# Patient Record
Sex: MALE | Race: WHITE | NOT HISPANIC OR LATINO | Employment: FULL TIME | URBAN - METROPOLITAN AREA
[De-identification: names, ages, dates, MRNs, and addresses within clinical notes are randomized per-mention and may not be internally consistent; named-entity substitution may affect disease eponyms.]

---

## 2017-02-14 ENCOUNTER — AMBULATORY - HEALTHEAST (OUTPATIENT)
Dept: FAMILY MEDICINE | Facility: CLINIC | Age: 42
End: 2017-02-14

## 2017-03-01 ENCOUNTER — RECORDS - HEALTHEAST (OUTPATIENT)
Dept: GENERAL RADIOLOGY | Facility: CLINIC | Age: 42
End: 2017-03-01

## 2017-03-01 ENCOUNTER — OFFICE VISIT - HEALTHEAST (OUTPATIENT)
Dept: FAMILY MEDICINE | Facility: CLINIC | Age: 42
End: 2017-03-01

## 2017-03-01 DIAGNOSIS — M25.551 HIP PAIN, RIGHT: ICD-10-CM

## 2017-03-01 DIAGNOSIS — L72.3 SEBACEOUS CYST: ICD-10-CM

## 2017-03-01 DIAGNOSIS — M25.551 PAIN IN RIGHT HIP: ICD-10-CM

## 2017-03-01 DIAGNOSIS — J40 BRONCHITIS: ICD-10-CM

## 2017-03-01 DIAGNOSIS — M54.2 CERVICALGIA: ICD-10-CM

## 2017-03-01 ASSESSMENT — MIFFLIN-ST. JEOR: SCORE: 1913.9

## 2017-03-06 ENCOUNTER — COMMUNICATION - HEALTHEAST (OUTPATIENT)
Dept: FAMILY MEDICINE | Facility: CLINIC | Age: 42
End: 2017-03-06

## 2017-03-06 DIAGNOSIS — M54.2 CERVICALGIA: ICD-10-CM

## 2017-03-06 DIAGNOSIS — M25.551 PAIN OF RIGHT HIP JOINT: ICD-10-CM

## 2017-03-29 ENCOUNTER — HOSPITAL ENCOUNTER (OUTPATIENT)
Dept: MRI IMAGING | Facility: CLINIC | Age: 42
Discharge: HOME | End: 2017-03-29
Attending: FAMILY MEDICINE

## 2017-03-29 DIAGNOSIS — M25.551 PAIN OF RIGHT HIP JOINT: ICD-10-CM

## 2017-03-31 ENCOUNTER — RECORDS - HEALTHEAST (OUTPATIENT)
Dept: ADMINISTRATIVE | Facility: OTHER | Age: 42
End: 2017-03-31

## 2017-04-03 ENCOUNTER — COMMUNICATION - HEALTHEAST (OUTPATIENT)
Dept: FAMILY MEDICINE | Facility: CLINIC | Age: 42
End: 2017-04-03

## 2017-04-04 ENCOUNTER — AMBULATORY - HEALTHEAST (OUTPATIENT)
Dept: FAMILY MEDICINE | Facility: CLINIC | Age: 42
End: 2017-04-04

## 2017-04-04 DIAGNOSIS — S73.199A ACETABULAR LABRUM TEAR: ICD-10-CM

## 2017-04-06 ENCOUNTER — RECORDS - HEALTHEAST (OUTPATIENT)
Dept: ADMINISTRATIVE | Facility: OTHER | Age: 42
End: 2017-04-06

## 2017-04-11 ENCOUNTER — COMMUNICATION - HEALTHEAST (OUTPATIENT)
Dept: FAMILY MEDICINE | Facility: CLINIC | Age: 42
End: 2017-04-11

## 2017-04-13 ENCOUNTER — AMBULATORY - HEALTHEAST (OUTPATIENT)
Dept: FAMILY MEDICINE | Facility: CLINIC | Age: 42
End: 2017-04-13

## 2017-04-13 ENCOUNTER — OFFICE VISIT - HEALTHEAST (OUTPATIENT)
Dept: FAMILY MEDICINE | Facility: CLINIC | Age: 42
End: 2017-04-13

## 2017-04-13 DIAGNOSIS — Z01.818 PREOP EXAMINATION: ICD-10-CM

## 2017-04-13 DIAGNOSIS — M24.159 LABRAL TEAR OF HIP, DEGENERATIVE: ICD-10-CM

## 2017-04-13 ASSESSMENT — MIFFLIN-ST. JEOR: SCORE: 1905.97

## 2017-04-21 ENCOUNTER — RECORDS - HEALTHEAST (OUTPATIENT)
Dept: ADMINISTRATIVE | Facility: OTHER | Age: 42
End: 2017-04-21

## 2017-05-09 ENCOUNTER — RECORDS - HEALTHEAST (OUTPATIENT)
Dept: ADMINISTRATIVE | Facility: OTHER | Age: 42
End: 2017-05-09

## 2017-05-18 ENCOUNTER — COMMUNICATION - HEALTHEAST (OUTPATIENT)
Dept: FAMILY MEDICINE | Facility: CLINIC | Age: 42
End: 2017-05-18

## 2017-05-26 ENCOUNTER — RECORDS - HEALTHEAST (OUTPATIENT)
Dept: ADMINISTRATIVE | Facility: OTHER | Age: 42
End: 2017-05-26

## 2017-06-06 ENCOUNTER — RECORDS - HEALTHEAST (OUTPATIENT)
Dept: ADMINISTRATIVE | Facility: OTHER | Age: 42
End: 2017-06-06

## 2017-07-18 ENCOUNTER — RECORDS - HEALTHEAST (OUTPATIENT)
Dept: ADMINISTRATIVE | Facility: OTHER | Age: 42
End: 2017-07-18

## 2017-07-20 ENCOUNTER — RECORDS - HEALTHEAST (OUTPATIENT)
Dept: ADMINISTRATIVE | Facility: OTHER | Age: 42
End: 2017-07-20

## 2017-07-26 ENCOUNTER — OFFICE VISIT - HEALTHEAST (OUTPATIENT)
Dept: FAMILY MEDICINE | Facility: CLINIC | Age: 42
End: 2017-07-26

## 2017-07-26 DIAGNOSIS — M25.552 LEFT HIP PAIN: ICD-10-CM

## 2017-07-26 DIAGNOSIS — M24.159 LABRAL TEAR OF HIP, DEGENERATIVE: ICD-10-CM

## 2017-07-26 DIAGNOSIS — K51.90 ULCERATIVE COLITIS WITHOUT COMPLICATIONS, UNSPECIFIED LOCATION (H): ICD-10-CM

## 2017-07-26 DIAGNOSIS — Z30.09 ENCOUNTER FOR VASECTOMY COUNSELING: ICD-10-CM

## 2017-08-19 ENCOUNTER — COMMUNICATION - HEALTHEAST (OUTPATIENT)
Dept: SCHEDULING | Facility: CLINIC | Age: 42
End: 2017-08-19

## 2017-08-19 ENCOUNTER — COMMUNICATION - HEALTHEAST (OUTPATIENT)
Dept: FAMILY MEDICINE | Facility: CLINIC | Age: 42
End: 2017-08-19

## 2017-08-19 DIAGNOSIS — M24.159 LABRAL TEAR OF HIP, DEGENERATIVE: ICD-10-CM

## 2017-08-22 ENCOUNTER — COMMUNICATION - HEALTHEAST (OUTPATIENT)
Dept: SCHEDULING | Facility: CLINIC | Age: 42
End: 2017-08-22

## 2017-08-22 DIAGNOSIS — M24.159 LABRAL TEAR OF HIP, DEGENERATIVE: ICD-10-CM

## 2017-08-28 ENCOUNTER — RECORDS - HEALTHEAST (OUTPATIENT)
Dept: ADMINISTRATIVE | Facility: OTHER | Age: 42
End: 2017-08-28

## 2017-09-08 ENCOUNTER — AMBULATORY - HEALTHEAST (OUTPATIENT)
Dept: FAMILY MEDICINE | Facility: CLINIC | Age: 42
End: 2017-09-08

## 2017-09-08 DIAGNOSIS — Z30.2 ENCOUNTER FOR VASECTOMY: ICD-10-CM

## 2017-09-12 ENCOUNTER — RECORDS - HEALTHEAST (OUTPATIENT)
Dept: ADMINISTRATIVE | Facility: OTHER | Age: 42
End: 2017-09-12

## 2017-09-13 LAB
LAB AP CHARGES (HE HISTORICAL CONVERSION): NORMAL
PATH REPORT.COMMENTS IMP SPEC: NORMAL
PATH REPORT.FINAL DX SPEC: NORMAL
PATH REPORT.GROSS SPEC: NORMAL
PATH REPORT.MICROSCOPIC SPEC OTHER STN: NORMAL
PATH REPORT.RELEVANT HX SPEC: NORMAL
RESULT FLAG (HE HISTORICAL CONVERSION): NORMAL

## 2017-10-10 ENCOUNTER — RECORDS - HEALTHEAST (OUTPATIENT)
Dept: ADMINISTRATIVE | Facility: OTHER | Age: 42
End: 2017-10-10

## 2017-10-12 ENCOUNTER — OFFICE VISIT - HEALTHEAST (OUTPATIENT)
Dept: FAMILY MEDICINE | Facility: CLINIC | Age: 42
End: 2017-10-12

## 2017-10-12 ENCOUNTER — AMBULATORY - HEALTHEAST (OUTPATIENT)
Dept: FAMILY MEDICINE | Facility: CLINIC | Age: 42
End: 2017-10-12

## 2017-10-12 DIAGNOSIS — J20.9 ACUTE WHEEZY BRONCHITIS: ICD-10-CM

## 2017-10-12 DIAGNOSIS — Z91.09 ENVIRONMENTAL ALLERGIES: ICD-10-CM

## 2017-10-12 DIAGNOSIS — K51.90 ULCERATIVE COLITIS WITHOUT COMPLICATIONS, UNSPECIFIED LOCATION (H): ICD-10-CM

## 2017-11-20 ENCOUNTER — RECORDS - HEALTHEAST (OUTPATIENT)
Dept: ADMINISTRATIVE | Facility: OTHER | Age: 42
End: 2017-11-20

## 2017-11-21 ENCOUNTER — RECORDS - HEALTHEAST (OUTPATIENT)
Dept: ADMINISTRATIVE | Facility: OTHER | Age: 42
End: 2017-11-21

## 2018-01-17 ENCOUNTER — RECORDS - HEALTHEAST (OUTPATIENT)
Dept: ADMINISTRATIVE | Facility: OTHER | Age: 43
End: 2018-01-17

## 2018-03-08 ENCOUNTER — OFFICE VISIT - HEALTHEAST (OUTPATIENT)
Dept: FAMILY MEDICINE | Facility: CLINIC | Age: 43
End: 2018-03-08

## 2018-03-08 ENCOUNTER — RECORDS - HEALTHEAST (OUTPATIENT)
Dept: GENERAL RADIOLOGY | Facility: CLINIC | Age: 43
End: 2018-03-08

## 2018-03-08 DIAGNOSIS — J18.9 CAP (COMMUNITY ACQUIRED PNEUMONIA): ICD-10-CM

## 2018-03-08 DIAGNOSIS — J18.9 PNEUMONIA, UNSPECIFIED ORGANISM: ICD-10-CM

## 2018-03-08 DIAGNOSIS — K51.90 ULCERATIVE COLITIS WITHOUT COMPLICATIONS, UNSPECIFIED LOCATION (H): ICD-10-CM

## 2018-03-08 DIAGNOSIS — Z98.52 HISTORY OF VASECTOMY: ICD-10-CM

## 2018-03-08 DIAGNOSIS — J20.9 ACUTE WHEEZY BRONCHITIS: ICD-10-CM

## 2018-03-08 LAB
ERYTHROCYTE [DISTWIDTH] IN BLOOD BY AUTOMATED COUNT: 12.3 % (ref 11–14.5)
HCT VFR BLD AUTO: 46.1 % (ref 40–54)
HGB BLD-MCNC: 15.8 G/DL (ref 14–18)
MCH RBC QN AUTO: 29.7 PG (ref 27–34)
MCHC RBC AUTO-ENTMCNC: 34.2 G/DL (ref 32–36)
MCV RBC AUTO: 87 FL (ref 80–100)
PLATELET # BLD AUTO: 167 THOU/UL (ref 140–440)
PMV BLD AUTO: 7.8 FL (ref 7–10)
RBC # BLD AUTO: 5.31 MILL/UL (ref 4.4–6.2)
WBC: 9.1 THOU/UL (ref 4–11)

## 2018-03-14 ENCOUNTER — COMMUNICATION - HEALTHEAST (OUTPATIENT)
Dept: FAMILY MEDICINE | Facility: CLINIC | Age: 43
End: 2018-03-14

## 2018-03-16 ENCOUNTER — RECORDS - HEALTHEAST (OUTPATIENT)
Dept: ADMINISTRATIVE | Facility: OTHER | Age: 43
End: 2018-03-16

## 2018-03-18 ENCOUNTER — COMMUNICATION - HEALTHEAST (OUTPATIENT)
Dept: FAMILY MEDICINE | Facility: CLINIC | Age: 43
End: 2018-03-18

## 2018-03-18 DIAGNOSIS — J20.9 ACUTE WHEEZY BRONCHITIS: ICD-10-CM

## 2018-03-21 ENCOUNTER — COMMUNICATION - HEALTHEAST (OUTPATIENT)
Dept: FAMILY MEDICINE | Facility: CLINIC | Age: 43
End: 2018-03-21

## 2018-03-21 DIAGNOSIS — J20.9 ACUTE WHEEZY BRONCHITIS: ICD-10-CM

## 2018-05-11 ENCOUNTER — RECORDS - HEALTHEAST (OUTPATIENT)
Dept: ADMINISTRATIVE | Facility: OTHER | Age: 43
End: 2018-05-11

## 2018-05-17 ENCOUNTER — RECORDS - HEALTHEAST (OUTPATIENT)
Dept: ADMINISTRATIVE | Facility: OTHER | Age: 43
End: 2018-05-17

## 2018-07-25 ENCOUNTER — RECORDS - HEALTHEAST (OUTPATIENT)
Dept: ADMINISTRATIVE | Facility: OTHER | Age: 43
End: 2018-07-25

## 2018-10-03 ENCOUNTER — COMMUNICATION - HEALTHEAST (OUTPATIENT)
Dept: SCHEDULING | Facility: CLINIC | Age: 43
End: 2018-10-03

## 2018-10-04 ENCOUNTER — OFFICE VISIT - HEALTHEAST (OUTPATIENT)
Dept: FAMILY MEDICINE | Facility: CLINIC | Age: 43
End: 2018-10-04

## 2018-10-04 ENCOUNTER — RECORDS - HEALTHEAST (OUTPATIENT)
Dept: ADMINISTRATIVE | Facility: OTHER | Age: 43
End: 2018-10-04

## 2018-10-04 DIAGNOSIS — M25.473 ANKLE SWELLING: ICD-10-CM

## 2018-10-04 DIAGNOSIS — Z00.00 HEALTHCARE MAINTENANCE: ICD-10-CM

## 2018-10-04 LAB
C REACTIVE PROTEIN LHE: 0.7 MG/DL (ref 0–0.8)
ERYTHROCYTE [SEDIMENTATION RATE] IN BLOOD BY WESTERGREN METHOD: 11 MM/HR (ref 0–15)
RHEUMATOID FACT SERPL-ACNC: <15 IU/ML (ref 0–30)

## 2018-10-04 ASSESSMENT — MIFFLIN-ST. JEOR: SCORE: 1857.65

## 2018-10-05 ENCOUNTER — COMMUNICATION - HEALTHEAST (OUTPATIENT)
Dept: FAMILY MEDICINE | Facility: CLINIC | Age: 43
End: 2018-10-05

## 2018-10-05 LAB — B BURGDOR IGG+IGM SER QL: <0.01 INDEX VALUE

## 2018-10-08 LAB — ANA SER QL: 0.2 U

## 2018-12-13 ENCOUNTER — RECORDS - HEALTHEAST (OUTPATIENT)
Dept: ADMINISTRATIVE | Facility: OTHER | Age: 43
End: 2018-12-13

## 2019-01-14 ENCOUNTER — RECORDS - HEALTHEAST (OUTPATIENT)
Dept: ADMINISTRATIVE | Facility: OTHER | Age: 44
End: 2019-01-14

## 2019-01-29 ENCOUNTER — RECORDS - HEALTHEAST (OUTPATIENT)
Dept: ADMINISTRATIVE | Facility: OTHER | Age: 44
End: 2019-01-29

## 2019-02-20 ENCOUNTER — RECORDS - HEALTHEAST (OUTPATIENT)
Dept: ADMINISTRATIVE | Facility: OTHER | Age: 44
End: 2019-02-20

## 2019-04-15 ENCOUNTER — OFFICE VISIT - HEALTHEAST (OUTPATIENT)
Dept: FAMILY MEDICINE | Facility: CLINIC | Age: 44
End: 2019-04-15

## 2019-04-15 DIAGNOSIS — S80.212A ABRASION OF LEFT KNEE, INITIAL ENCOUNTER: ICD-10-CM

## 2019-04-17 ENCOUNTER — RECORDS - HEALTHEAST (OUTPATIENT)
Dept: ADMINISTRATIVE | Facility: OTHER | Age: 44
End: 2019-04-17

## 2019-05-04 ENCOUNTER — RECORDS - HEALTHEAST (OUTPATIENT)
Dept: ADMINISTRATIVE | Facility: OTHER | Age: 44
End: 2019-05-04

## 2019-05-17 ENCOUNTER — RECORDS - HEALTHEAST (OUTPATIENT)
Dept: LAB | Facility: CLINIC | Age: 44
End: 2019-05-17

## 2019-05-20 LAB — BACTERIA SPEC CULT: NORMAL

## 2019-05-22 LAB
BACTERIA SPEC CULT: ABNORMAL
GRAM STAIN RESULT: ABNORMAL
GRAM STAIN RESULT: ABNORMAL

## 2019-05-26 ENCOUNTER — RECORDS - HEALTHEAST (OUTPATIENT)
Dept: ADMINISTRATIVE | Facility: OTHER | Age: 44
End: 2019-05-26

## 2019-05-29 ENCOUNTER — ANESTHESIA - HEALTHEAST (OUTPATIENT)
Dept: SURGERY | Facility: CLINIC | Age: 44
End: 2019-05-29

## 2019-05-29 ENCOUNTER — SURGERY - HEALTHEAST (OUTPATIENT)
Dept: SURGERY | Facility: CLINIC | Age: 44
End: 2019-05-29

## 2019-05-29 ASSESSMENT — MIFFLIN-ST. JEOR: SCORE: 1859.75

## 2019-05-30 ENCOUNTER — HOME CARE/HOSPICE - HEALTHEAST (OUTPATIENT)
Dept: HOME HEALTH SERVICES | Facility: HOME HEALTH | Age: 44
End: 2019-05-30

## 2019-06-01 ASSESSMENT — MIFFLIN-ST. JEOR: SCORE: 1900.29

## 2019-06-03 ENCOUNTER — COMMUNICATION - HEALTHEAST (OUTPATIENT)
Dept: ADMINISTRATIVE | Facility: CLINIC | Age: 44
End: 2019-06-03

## 2019-06-04 ENCOUNTER — COMMUNICATION - HEALTHEAST (OUTPATIENT)
Dept: CARE COORDINATION | Facility: CLINIC | Age: 44
End: 2019-06-04

## 2019-06-10 ENCOUNTER — OFFICE VISIT - HEALTHEAST (OUTPATIENT)
Dept: FAMILY MEDICINE | Facility: CLINIC | Age: 44
End: 2019-06-10

## 2019-06-10 DIAGNOSIS — K51.90 ULCERATIVE COLITIS WITHOUT COMPLICATIONS, UNSPECIFIED LOCATION (H): ICD-10-CM

## 2019-06-10 DIAGNOSIS — M70.42 PREPATELLAR BURSITIS OF LEFT KNEE: ICD-10-CM

## 2019-06-10 DIAGNOSIS — D64.9 ANEMIA, UNSPECIFIED TYPE: ICD-10-CM

## 2019-06-12 ENCOUNTER — AMBULATORY - HEALTHEAST (OUTPATIENT)
Dept: LAB | Facility: CLINIC | Age: 44
End: 2019-06-12

## 2019-06-12 ENCOUNTER — OFFICE VISIT - HEALTHEAST (OUTPATIENT)
Dept: INFECTIOUS DISEASES | Facility: CLINIC | Age: 44
End: 2019-06-12

## 2019-06-12 DIAGNOSIS — M70.42 PREPATELLAR BURSITIS, LEFT KNEE: ICD-10-CM

## 2019-06-12 DIAGNOSIS — M70.42 PREPATELLAR BURSITIS OF LEFT KNEE: ICD-10-CM

## 2019-06-12 LAB
ANION GAP SERPL CALCULATED.3IONS-SCNC: 6 MMOL/L (ref 5–18)
BASOPHILS # BLD AUTO: 0 THOU/UL (ref 0–0.2)
BASOPHILS NFR BLD AUTO: 1 % (ref 0–2)
BUN SERPL-MCNC: 14 MG/DL (ref 8–22)
C REACTIVE PROTEIN LHE: 0.2 MG/DL (ref 0–0.8)
CALCIUM SERPL-MCNC: 10.4 MG/DL (ref 8.5–10.5)
CHLORIDE BLD-SCNC: 102 MMOL/L (ref 98–107)
CO2 SERPL-SCNC: 30 MMOL/L (ref 22–31)
CREAT SERPL-MCNC: 0.96 MG/DL (ref 0.7–1.3)
EOSINOPHIL # BLD AUTO: 0.4 THOU/UL (ref 0–0.4)
EOSINOPHIL NFR BLD AUTO: 6 % (ref 0–6)
ERYTHROCYTE [DISTWIDTH] IN BLOOD BY AUTOMATED COUNT: 11.6 % (ref 11–14.5)
GFR SERPL CREATININE-BSD FRML MDRD: >60 ML/MIN/1.73M2
GLUCOSE BLD-MCNC: 90 MG/DL (ref 70–125)
HCT VFR BLD AUTO: 44.8 % (ref 40–54)
HGB BLD-MCNC: 15.2 G/DL (ref 14–18)
LYMPHOCYTES # BLD AUTO: 1.9 THOU/UL (ref 0.8–4.4)
LYMPHOCYTES NFR BLD AUTO: 30 % (ref 20–40)
MCH RBC QN AUTO: 29.4 PG (ref 27–34)
MCHC RBC AUTO-ENTMCNC: 33.9 G/DL (ref 32–36)
MCV RBC AUTO: 87 FL (ref 80–100)
MONOCYTES # BLD AUTO: 0.4 THOU/UL (ref 0–0.9)
MONOCYTES NFR BLD AUTO: 6 % (ref 2–10)
NEUTROPHILS # BLD AUTO: 3.6 THOU/UL (ref 2–7.7)
NEUTROPHILS NFR BLD AUTO: 57 % (ref 50–70)
PLATELET # BLD AUTO: 298 THOU/UL (ref 140–440)
PMV BLD AUTO: 7.2 FL (ref 7–10)
POTASSIUM BLD-SCNC: 5 MMOL/L (ref 3.5–5)
RBC # BLD AUTO: 5.17 MILL/UL (ref 4.4–6.2)
SODIUM SERPL-SCNC: 138 MMOL/L (ref 136–145)
WBC: 6.4 THOU/UL (ref 4–11)

## 2019-06-12 ASSESSMENT — MIFFLIN-ST. JEOR: SCORE: 1878.75

## 2019-06-25 ENCOUNTER — COMMUNICATION - HEALTHEAST (OUTPATIENT)
Dept: FAMILY MEDICINE | Facility: CLINIC | Age: 44
End: 2019-06-25

## 2019-06-25 ENCOUNTER — RECORDS - HEALTHEAST (OUTPATIENT)
Dept: ADMINISTRATIVE | Facility: OTHER | Age: 44
End: 2019-06-25

## 2019-06-25 DIAGNOSIS — M70.42 PREPATELLAR BURSITIS OF LEFT KNEE: ICD-10-CM

## 2019-06-26 ENCOUNTER — OFFICE VISIT - HEALTHEAST (OUTPATIENT)
Dept: INFECTIOUS DISEASES | Facility: CLINIC | Age: 44
End: 2019-06-26

## 2019-06-26 ENCOUNTER — AMBULATORY - HEALTHEAST (OUTPATIENT)
Dept: LAB | Facility: CLINIC | Age: 44
End: 2019-06-26

## 2019-06-26 ENCOUNTER — COMMUNICATION - HEALTHEAST (OUTPATIENT)
Dept: INFECTIOUS DISEASES | Facility: CLINIC | Age: 44
End: 2019-06-26

## 2019-06-26 DIAGNOSIS — M70.42 PREPATELLAR BURSITIS OF LEFT KNEE: ICD-10-CM

## 2019-06-26 LAB
BASOPHILS # BLD AUTO: 0 THOU/UL (ref 0–0.2)
BASOPHILS NFR BLD AUTO: 0 % (ref 0–2)
C REACTIVE PROTEIN LHE: <0.1 MG/DL (ref 0–0.8)
CREAT SERPL-MCNC: 0.86 MG/DL (ref 0.7–1.3)
EOSINOPHIL # BLD AUTO: 0.2 THOU/UL (ref 0–0.4)
EOSINOPHIL NFR BLD AUTO: 4 % (ref 0–6)
ERYTHROCYTE [DISTWIDTH] IN BLOOD BY AUTOMATED COUNT: 12.1 % (ref 11–14.5)
GFR SERPL CREATININE-BSD FRML MDRD: >60 ML/MIN/1.73M2
HCT VFR BLD AUTO: 46.5 % (ref 40–54)
HGB BLD-MCNC: 15.6 G/DL (ref 14–18)
LYMPHOCYTES # BLD AUTO: 1.9 THOU/UL (ref 0.8–4.4)
LYMPHOCYTES NFR BLD AUTO: 35 % (ref 20–40)
MCH RBC QN AUTO: 29.2 PG (ref 27–34)
MCHC RBC AUTO-ENTMCNC: 33.6 G/DL (ref 32–36)
MCV RBC AUTO: 87 FL (ref 80–100)
MONOCYTES # BLD AUTO: 0.3 THOU/UL (ref 0–0.9)
MONOCYTES NFR BLD AUTO: 6 % (ref 2–10)
NEUTROPHILS # BLD AUTO: 3 THOU/UL (ref 2–7.7)
NEUTROPHILS NFR BLD AUTO: 55 % (ref 50–70)
PLATELET # BLD AUTO: 195 THOU/UL (ref 140–440)
PMV BLD AUTO: 8.1 FL (ref 7–10)
RBC # BLD AUTO: 5.34 MILL/UL (ref 4.4–6.2)
WBC: 5.4 THOU/UL (ref 4–11)

## 2019-06-26 ASSESSMENT — MIFFLIN-ST. JEOR: SCORE: 1850.63

## 2019-07-19 ENCOUNTER — RECORDS - HEALTHEAST (OUTPATIENT)
Dept: ADMINISTRATIVE | Facility: OTHER | Age: 44
End: 2019-07-19

## 2019-07-31 ENCOUNTER — COMMUNICATION - HEALTHEAST (OUTPATIENT)
Dept: INFECTIOUS DISEASES | Facility: CLINIC | Age: 44
End: 2019-07-31

## 2019-08-02 ENCOUNTER — RECORDS - HEALTHEAST (OUTPATIENT)
Dept: ADMINISTRATIVE | Facility: OTHER | Age: 44
End: 2019-08-02

## 2019-08-09 ENCOUNTER — OFFICE VISIT - HEALTHEAST (OUTPATIENT)
Dept: FAMILY MEDICINE | Facility: CLINIC | Age: 44
End: 2019-08-09

## 2019-08-09 DIAGNOSIS — H69.91 DYSFUNCTION OF RIGHT EUSTACHIAN TUBE: ICD-10-CM

## 2019-08-09 DIAGNOSIS — K51.90 ULCERATIVE COLITIS WITHOUT COMPLICATIONS, UNSPECIFIED LOCATION (H): ICD-10-CM

## 2019-08-09 DIAGNOSIS — M70.42 PREPATELLAR BURSITIS OF LEFT KNEE: ICD-10-CM

## 2019-11-05 ENCOUNTER — OFFICE VISIT - HEALTHEAST (OUTPATIENT)
Dept: FAMILY MEDICINE | Facility: CLINIC | Age: 44
End: 2019-11-05

## 2019-11-05 DIAGNOSIS — M25.552 PAIN OF BOTH HIP JOINTS: ICD-10-CM

## 2019-11-05 DIAGNOSIS — R53.83 FATIGUE, UNSPECIFIED TYPE: ICD-10-CM

## 2019-11-05 DIAGNOSIS — K51.90 ULCERATIVE COLITIS WITHOUT COMPLICATIONS, UNSPECIFIED LOCATION (H): ICD-10-CM

## 2019-11-05 DIAGNOSIS — M25.511 PAIN IN JOINT OF RIGHT SHOULDER: ICD-10-CM

## 2019-11-05 DIAGNOSIS — M25.562 ARTHRALGIA OF BOTH KNEES: ICD-10-CM

## 2019-11-05 DIAGNOSIS — M25.551 PAIN OF BOTH HIP JOINTS: ICD-10-CM

## 2019-11-05 DIAGNOSIS — R53.81 MALAISE: ICD-10-CM

## 2019-11-05 DIAGNOSIS — M25.561 ARTHRALGIA OF BOTH KNEES: ICD-10-CM

## 2019-11-05 LAB
25(OH)D3 SERPL-MCNC: 24.3 NG/ML (ref 30–80)
25(OH)D3 SERPL-MCNC: 24.3 NG/ML (ref 30–80)
ALBUMIN SERPL-MCNC: 4.5 G/DL (ref 3.5–5)
ALP SERPL-CCNC: 96 U/L (ref 45–120)
ALT SERPL W P-5'-P-CCNC: 21 U/L (ref 0–45)
ANION GAP SERPL CALCULATED.3IONS-SCNC: 11 MMOL/L (ref 5–18)
AST SERPL W P-5'-P-CCNC: 22 U/L (ref 0–40)
BASOPHILS # BLD AUTO: 0 THOU/UL (ref 0–0.2)
BASOPHILS NFR BLD AUTO: 1 % (ref 0–2)
BILIRUB SERPL-MCNC: 1.1 MG/DL (ref 0–1)
BUN SERPL-MCNC: 14 MG/DL (ref 8–22)
C REACTIVE PROTEIN LHE: 0.2 MG/DL (ref 0–0.8)
CALCIUM SERPL-MCNC: 9.5 MG/DL (ref 8.5–10.5)
CHLORIDE BLD-SCNC: 105 MMOL/L (ref 98–107)
CK SERPL-CCNC: 91 U/L (ref 30–190)
CO2 SERPL-SCNC: 25 MMOL/L (ref 22–31)
CREAT SERPL-MCNC: 1.01 MG/DL (ref 0.7–1.3)
EOSINOPHIL # BLD AUTO: 0.3 THOU/UL (ref 0–0.4)
EOSINOPHIL NFR BLD AUTO: 4 % (ref 0–6)
ERYTHROCYTE [DISTWIDTH] IN BLOOD BY AUTOMATED COUNT: 12.4 % (ref 11–14.5)
GFR SERPL CREATININE-BSD FRML MDRD: >60 ML/MIN/1.73M2
GLUCOSE BLD-MCNC: 72 MG/DL (ref 70–125)
HCT VFR BLD AUTO: 47.4 % (ref 40–54)
HGB BLD-MCNC: 16.5 G/DL (ref 14–18)
LYMPHOCYTES # BLD AUTO: 2.5 THOU/UL (ref 0.8–4.4)
LYMPHOCYTES NFR BLD AUTO: 34 % (ref 20–40)
MCH RBC QN AUTO: 30.3 PG (ref 27–34)
MCHC RBC AUTO-ENTMCNC: 34.7 G/DL (ref 32–36)
MCV RBC AUTO: 87 FL (ref 80–100)
MONOCYTES # BLD AUTO: 0.5 THOU/UL (ref 0–0.9)
MONOCYTES NFR BLD AUTO: 7 % (ref 2–10)
NEUTROPHILS # BLD AUTO: 4 THOU/UL (ref 2–7.7)
NEUTROPHILS NFR BLD AUTO: 54 % (ref 50–70)
PLATELET # BLD AUTO: 213 THOU/UL (ref 140–440)
PMV BLD AUTO: 7.9 FL (ref 7–10)
POTASSIUM BLD-SCNC: 4 MMOL/L (ref 3.5–5)
PROT SERPL-MCNC: 7.6 G/DL (ref 6–8)
RBC # BLD AUTO: 5.43 MILL/UL (ref 4.4–6.2)
SODIUM SERPL-SCNC: 141 MMOL/L (ref 136–145)
TSH SERPL DL<=0.005 MIU/L-ACNC: 1.95 UIU/ML (ref 0.3–5)
VIT B12 SERPL-MCNC: 684 PG/ML (ref 213–816)
WBC: 7.4 THOU/UL (ref 4–11)

## 2019-11-07 LAB
GLIADIN IGA SER-ACNC: 1.5 U/ML
GLIADIN IGG SER-ACNC: <0.4 U/ML
IGA SERPL-MCNC: 397 MG/DL (ref 65–400)
TTG IGA SER-ACNC: 0.5 U/ML
TTG IGG SER-ACNC: <0.6 U/ML

## 2019-11-19 ENCOUNTER — COMMUNICATION - HEALTHEAST (OUTPATIENT)
Dept: FAMILY MEDICINE | Facility: CLINIC | Age: 44
End: 2019-11-19

## 2019-11-20 ENCOUNTER — COMMUNICATION - HEALTHEAST (OUTPATIENT)
Dept: FAMILY MEDICINE | Facility: CLINIC | Age: 44
End: 2019-11-20

## 2019-11-22 ENCOUNTER — RECORDS - HEALTHEAST (OUTPATIENT)
Dept: ADMINISTRATIVE | Facility: OTHER | Age: 44
End: 2019-11-22

## 2019-11-25 ENCOUNTER — RECORDS - HEALTHEAST (OUTPATIENT)
Dept: ADMINISTRATIVE | Facility: OTHER | Age: 44
End: 2019-11-25

## 2019-12-03 ENCOUNTER — RECORDS - HEALTHEAST (OUTPATIENT)
Dept: ADMINISTRATIVE | Facility: OTHER | Age: 44
End: 2019-12-03

## 2019-12-05 ENCOUNTER — RECORDS - HEALTHEAST (OUTPATIENT)
Dept: ADMINISTRATIVE | Facility: OTHER | Age: 44
End: 2019-12-05

## 2020-01-14 ENCOUNTER — RECORDS - HEALTHEAST (OUTPATIENT)
Dept: ADMINISTRATIVE | Facility: OTHER | Age: 45
End: 2020-01-14

## 2020-03-02 ENCOUNTER — RECORDS - HEALTHEAST (OUTPATIENT)
Dept: ADMINISTRATIVE | Facility: OTHER | Age: 45
End: 2020-03-02

## 2020-03-03 ENCOUNTER — RECORDS - HEALTHEAST (OUTPATIENT)
Dept: ADMINISTRATIVE | Facility: OTHER | Age: 45
End: 2020-03-03

## 2020-03-04 ENCOUNTER — RECORDS - HEALTHEAST (OUTPATIENT)
Dept: ADMINISTRATIVE | Facility: OTHER | Age: 45
End: 2020-03-04

## 2020-03-09 ENCOUNTER — AMBULATORY - HEALTHEAST (OUTPATIENT)
Dept: FAMILY MEDICINE | Facility: CLINIC | Age: 45
End: 2020-03-09

## 2020-03-09 ENCOUNTER — OFFICE VISIT - HEALTHEAST (OUTPATIENT)
Dept: FAMILY MEDICINE | Facility: CLINIC | Age: 45
End: 2020-03-09

## 2020-03-09 DIAGNOSIS — M25.521 CHRONIC ELBOW PAIN, RIGHT: ICD-10-CM

## 2020-03-09 DIAGNOSIS — G89.29 CHRONIC ELBOW PAIN, RIGHT: ICD-10-CM

## 2020-03-09 DIAGNOSIS — M25.551 HIP PAIN, RIGHT: ICD-10-CM

## 2020-03-12 ENCOUNTER — COMMUNICATION - HEALTHEAST (OUTPATIENT)
Dept: FAMILY MEDICINE | Facility: CLINIC | Age: 45
End: 2020-03-12

## 2020-03-12 DIAGNOSIS — M70.42 PREPATELLAR BURSITIS OF LEFT KNEE: ICD-10-CM

## 2020-04-13 ENCOUNTER — COMMUNICATION - HEALTHEAST (OUTPATIENT)
Dept: FAMILY MEDICINE | Facility: CLINIC | Age: 45
End: 2020-04-13

## 2020-04-14 ENCOUNTER — OFFICE VISIT - HEALTHEAST (OUTPATIENT)
Dept: FAMILY MEDICINE | Facility: CLINIC | Age: 45
End: 2020-04-14

## 2020-04-14 DIAGNOSIS — M70.42 PREPATELLAR BURSITIS OF LEFT KNEE: ICD-10-CM

## 2020-04-24 ENCOUNTER — RECORDS - HEALTHEAST (OUTPATIENT)
Dept: ADMINISTRATIVE | Facility: OTHER | Age: 45
End: 2020-04-24

## 2020-06-11 ENCOUNTER — RECORDS - HEALTHEAST (OUTPATIENT)
Dept: ADMINISTRATIVE | Facility: OTHER | Age: 45
End: 2020-06-11

## 2020-06-11 LAB
ALT SERPL W/O P-5'-P-CCNC: 29 U/L (ref 0–78)
AST SERPL-CCNC: 18 U/L (ref 0–37)

## 2020-06-18 ENCOUNTER — RECORDS - HEALTHEAST (OUTPATIENT)
Dept: HEALTH INFORMATION MANAGEMENT | Facility: CLINIC | Age: 45
End: 2020-06-18

## 2020-07-31 ENCOUNTER — RECORDS - HEALTHEAST (OUTPATIENT)
Dept: ADMINISTRATIVE | Facility: OTHER | Age: 45
End: 2020-07-31

## 2020-08-04 ENCOUNTER — VIRTUAL VISIT (OUTPATIENT)
Dept: FAMILY MEDICINE | Facility: OTHER | Age: 45
End: 2020-08-04
Payer: COMMERCIAL

## 2020-08-04 ENCOUNTER — AMBULATORY - HEALTHEAST (OUTPATIENT)
Dept: FAMILY MEDICINE | Facility: CLINIC | Age: 45
End: 2020-08-04

## 2020-08-04 DIAGNOSIS — Z20.822 SUSPECTED COVID-19 VIRUS INFECTION: ICD-10-CM

## 2020-08-04 PROCEDURE — 99421 OL DIG E/M SVC 5-10 MIN: CPT | Performed by: PHYSICIAN ASSISTANT

## 2020-08-04 NOTE — PROGRESS NOTES
"Date: 2020 10:16:58  Clinician: Tyson Melendez  Clinician NPI: [id number]  Patient: Edwin Becker  Patient : 1975  Patient Address: [address] [place] [address]  Patient Phone: [phone]  Visit Protocol: URI  Patient Summary:  Edwin is a 45 year old ( : 1975 ) male who initiated a Visit for COVID-19 (Coronavirus) evaluation and screening. When asked the question \"Please sign me up to receive news, health information and promotions from [employer].\", Edwin responded \"No\".    Edwin states his symptoms started 1-2 days ago.   His symptoms consist of myalgia, a sore throat, a cough, nasal congestion, rhinitis, and malaise.   Symptom details     Nasal secretions: The color of his mucus is yellow.    Cough: Edwin coughs a few times an hour and his cough is not more bothersome at night. Phlegm does not come into his throat when he coughs. He does not believe his cough is caused by post-nasal drip.     Sore throat: Edwin reports having mild throat pain (1-3 on a 10 point pain scale), does not have exudate on his tonsils, and can swallow liquids. He is not sure if the lymph nodes in his neck are enlarged. A rash has not appeared on the skin since the sore throat started.      Edwin denies having wheezing, nausea, teeth pain, ageusia, diarrhea, anosmia, facial pain or pressure, fever, vomiting, ear pain, headache, and chills. He also denies having recent facial or sinus surgery in the past 60 days and taking antibiotic medication in the past month. He is not experiencing dyspnea.   Precipitating events  Within the past week, Edwin has not been exposed to someone with strep throat. He has not recently been exposed to someone with influenza. Edwin has not been in close contact with any high risk individuals.   Pertinent COVID-19 (Coronavirus) information  In the past 14 days, Edwin has not worked in a congregate living setting.   He does not work or volunteer as healthcare worker or a first " responder and does not work or volunteer in a healthcare facility.   Edwin also has not lived in a congregate living setting in the past 14 days. He does not live with a healthcare worker.   Edwin has not had a close contact with a laboratory-confirmed COVID-19 patient within 14 days of symptom onset.   Pertinent medical history  Edwin does not need a return to work/school note.   Weight: 215 lbs   Edwin does not smoke or use smokeless tobacco.   Weight: 215 lbs    MEDICATIONS: Remicade intravenous, ALLERGIES: NKDA  Clinician Response:  Dear Edwin,   Your symptoms show that you may have coronavirus (COVID-19). This illness can cause fever, cough and trouble breathing. Many people get a mild case and get better on their own. Some people can get very sick.  What should I do?  We would like to test you for this virus.   1. Please call [phone] to schedule your visit. Explain that you were referred by [facility] to have a COVID-19 test. Be ready to share your [facility] visit ID number.  The following will serve as your written order for this COVID Test, ordered by me, for the indication of suspected COVID [Z20.828]: The test will be ordered in [facility], our electronic health record, after you are scheduled. It will show as ordered and authorized by Ismael Toure MD.  Order: COVID-19 (Coronavirus) PCR for SYMPTOMATIC testing from [facility].      2. When it's time for your COVID test:  Stay at least 6 feet away from others. (If someone will drive you to your test, stay in the backseat, as far away from the  as you can.)   Cover your mouth and nose with a mask, tissue or washcloth.  Go straight to the testing site. Don't make any stops on the way there or back.      3.Starting now: Stay home and away from others (self-isolate) until:   You've had no fever---and no medicine that reduces fever---for 3 full days (72 hours). And...   Your other symptoms have gotten better. For example, your cough or breathing has improved.  "And...   At least 10 days have passed since your symptoms started.       During this time, don't leave the house except for testing or medical care.   Stay in your own room, even for meals. Use your own bathroom if you can.   Stay away from others in your home. No hugging, kissing or shaking hands. No visitors.  Don't go to work, school or anywhere else.    Clean \"high touch\" surfaces often (doorknobs, counters, handles, etc.). Use a household cleaning spray or wipes. You'll find a full list of  on the EPA website: www.epa.gov/pesticide-registration/list-n-disinfectants-use-against-sars-cov-2.   Cover your mouth and nose with a mask, tissue or washcloth to avoid spreading germs.  Wash your hands and face often. Use soap and water.  Caregivers in these groups are at risk for severe illness due to COVID-19:  o People 65 years and older  o People who live in a nursing home or long-term care facility  o People with chronic disease (lung, heart, cancer, diabetes, kidney, liver, immunologic)  o People who have a weakened immune system, including those who:   Are in cancer treatment  Take medicine that weakens the immune system, such as corticosteroids  Had a bone marrow or organ transplant  Have an immune deficiency  Have poorly controlled HIV or AIDS  Are obese (body mass index of 40 or higher)  Smoke regularly   o Caregivers should wear gloves while washing dishes, handling laundry and cleaning bedrooms and bathrooms.  o Use caution when washing and drying laundry: Don't shake dirty laundry, and use the warmest water setting that you can.  o For more tips, go to www.cdc.gov/coronavirus/2019-ncov/downloads/10Things.pdf.    4.Sign up for [employer]. We know it's scary to hear that you might have COVID-19. We want to track your symptoms to make sure you're okay over the next 2 weeks. Please look for an email from [employer]---this is a free, online program that we'll use to keep in touch. To sign up, follow the " link in the email. Learn more at [email or web address]  How can I take care of myself?   Get lots of rest. Drink extra fluids (unless a doctor has told you not to).   Take Tylenol (acetaminophen) for fever or pain. If you have liver or kidney problems, ask your family doctor if it's okay to take Tylenol.   Adults can take either:    650 mg (two 325 mg pills) every 4 to 6 hours, or...   1,000 mg (two 500 mg pills) every 8 hours as needed.    Note: Don't take more than 3,000 mg in one day. Acetaminophen is found in many medicines (both prescribed and over-the-counter medicines). Read all labels to be sure you don't take too much.   For children, check the Tylenol bottle for the right dose. The dose is based on the child's age or weight.    If you have other health problems (like cancer, heart failure, an organ transplant or severe kidney disease): Call your specialty clinic if you don't feel better in the next 2 days.       Know when to call 911. Emergency warning signs include:    Trouble breathing or shortness of breath Pain or pressure in the chest that doesn't go away Feeling confused like you haven't felt before, or not being able to wake up Bluish-colored lips or face.  Where can I get more information?   [facility] -- About COVID-19: [email or web address]   CDC -- What to Do If You're Sick: www.cdc.gov/coronavirus/2019-ncov/about/steps-when-sick.html   CDC -- Ending Home Isolation: www.cdc.gov/coronavirus/2019-ncov/hcp/disposition-in-home-patients.html   CDC -- Caring for Someone: www.cdc.gov/coronavirus/2019-ncov/if-you-are-sick/care-for-someone.html   [facility] -- Interim Guidance for Hospital Discharge to Home: www.health.[facility].[place]/diseases/coronavirus/hcp/hospdischarge.pdf   [facility] clinical trials (COVID-19 research studies): clinicalaffairs.[facility]/umn-clinical-trials    Below are the COVID-19 hotlines at the [place] Department of Health ([facility]). Interpreters are  available.    For health questions: Call 273-998-9148 or 1-434.511.3599 (7 a.m. to 7 p.m.) For questions about schools and childcare: Call 517-849-6292 or 1-784.206.2822 (7 a.m. to 7 p.m.)    Diagnosis: Pain in throat  Diagnosis ICD: R07.0

## 2020-08-05 ENCOUNTER — AMBULATORY - HEALTHEAST (OUTPATIENT)
Dept: FAMILY MEDICINE | Facility: CLINIC | Age: 45
End: 2020-08-05

## 2020-08-05 DIAGNOSIS — Z20.822 SUSPECTED COVID-19 VIRUS INFECTION: ICD-10-CM

## 2020-08-07 ENCOUNTER — COMMUNICATION - HEALTHEAST (OUTPATIENT)
Dept: SCHEDULING | Facility: CLINIC | Age: 45
End: 2020-08-07

## 2020-09-18 ENCOUNTER — RECORDS - HEALTHEAST (OUTPATIENT)
Dept: ADMINISTRATIVE | Facility: OTHER | Age: 45
End: 2020-09-18

## 2020-11-03 ENCOUNTER — COMMUNICATION - HEALTHEAST (OUTPATIENT)
Dept: FAMILY MEDICINE | Facility: CLINIC | Age: 45
End: 2020-11-03

## 2020-11-19 ENCOUNTER — RECORDS - HEALTHEAST (OUTPATIENT)
Dept: ADMINISTRATIVE | Facility: OTHER | Age: 45
End: 2020-11-19

## 2020-11-19 LAB
ALT SERPL W/O P-5'-P-CCNC: 34 U/L (ref 0–78)
AST SERPL-CCNC: 22 U/L (ref 0–37)

## 2020-11-25 ENCOUNTER — RECORDS - HEALTHEAST (OUTPATIENT)
Dept: HEALTH INFORMATION MANAGEMENT | Facility: CLINIC | Age: 45
End: 2020-11-25

## 2021-02-15 ENCOUNTER — RECORDS - HEALTHEAST (OUTPATIENT)
Dept: ADMINISTRATIVE | Facility: OTHER | Age: 46
End: 2021-02-15

## 2021-03-27 ENCOUNTER — AMBULATORY - HEALTHEAST (OUTPATIENT)
Dept: NURSING | Facility: CLINIC | Age: 46
End: 2021-03-27

## 2021-04-17 ENCOUNTER — AMBULATORY - HEALTHEAST (OUTPATIENT)
Dept: NURSING | Facility: CLINIC | Age: 46
End: 2021-04-17

## 2021-05-04 ENCOUNTER — RECORDS - HEALTHEAST (OUTPATIENT)
Dept: ADMINISTRATIVE | Facility: OTHER | Age: 46
End: 2021-05-04

## 2021-05-04 ENCOUNTER — OFFICE VISIT - HEALTHEAST (OUTPATIENT)
Dept: FAMILY MEDICINE | Facility: CLINIC | Age: 46
End: 2021-05-04

## 2021-05-04 DIAGNOSIS — R05.9 COUGH: ICD-10-CM

## 2021-05-04 DIAGNOSIS — K21.9 GASTROESOPHAGEAL REFLUX DISEASE, UNSPECIFIED WHETHER ESOPHAGITIS PRESENT: ICD-10-CM

## 2021-05-04 DIAGNOSIS — R13.10 DYSPHAGIA, UNSPECIFIED TYPE: ICD-10-CM

## 2021-05-27 ENCOUNTER — RECORDS - HEALTHEAST (OUTPATIENT)
Dept: ADMINISTRATIVE | Facility: OTHER | Age: 46
End: 2021-05-27

## 2021-05-27 VITALS
TEMPERATURE: 98.7 F | BODY MASS INDEX: 29.03 KG/M2 | OXYGEN SATURATION: 98 % | HEART RATE: 85 BPM | DIASTOLIC BLOOD PRESSURE: 78 MMHG | WEIGHT: 220 LBS | SYSTOLIC BLOOD PRESSURE: 128 MMHG

## 2021-05-27 NOTE — PATIENT INSTRUCTIONS - HE
1.  Recommend cleansing the wound with gentle soap and water.  Pat dry gently and cover with bandage.  2.  Keep dressing on until healing is more complete, expected 1 week.  Change dressing every 12 hours.  3.  Ice the knee on and off for 20 minutes at a time up to 3 times a day.  4.  Take Tylenol or ibuprofen as needed for pain control.  5.  Follow-up if you are developing any signs of infection or worsening symptoms including increased pain, swelling, redness, or fever.    
Rash

## 2021-05-27 NOTE — PROGRESS NOTES
Chief Complaint   Patient presents with     Leg Injury     states hit in Lt leg with a coil spring at work, states came out of tool he was working with. has an abraison on lateral LT leg       HPI:  Edwin Becker is a 43 y.o. male who presents today complaining of left leg pain after a coil spring at work flew out of the tool he was working on and hit him in the leg. He has an abrasion on the lateral aspect of his left leg. He was wearing jeans when the injury occurred. He is able to bear weight and does not report severe pain. He is up to date on his Tetanus vaccine status. Last Tdap was 9/27/2012.     History obtained from the patient.    Problem List:  2017-04: Labral tear of hip, degenerative  2014-12: Cough  Dyslipidemia  Anemia  Ulcerative Colitis  Cervicalgia  Rotator Cuff Tendonitis  Trapezoid Muscle Strain  Pneumonia      No past medical history on file.    Social History     Tobacco Use     Smoking status: Former Smoker     Last attempt to quit: 7/27/2003     Years since quitting: 15.7     Smokeless tobacco: Never Used   Substance Use Topics     Alcohol use: No       Review of Systems   Musculoskeletal: Negative for gait problem.        (+) left knee pain   Skin: Positive for wound.   All other systems reviewed and are negative.      Vitals:    04/15/19 1517   BP: 116/80   Patient Site: Right Arm   Patient Position: Sitting   Cuff Size: Adult Regular   Pulse: 89   Temp: 98.4  F (36.9  C)   TempSrc: Oral   SpO2: 98%   Weight: 210 lb (95.3 kg)       Physical Exam   Constitutional: He appears well-developed and well-nourished. No distress.   HENT:   Head: Normocephalic and atraumatic.   Right Ear: External ear normal.   Left Ear: External ear normal.   Eyes: Conjunctivae are normal. Right eye exhibits no discharge. Left eye exhibits no discharge.   Pulmonary/Chest: Effort normal. No respiratory distress.   Musculoskeletal:        Left knee: He exhibits normal range of motion, no swelling, no effusion, no  deformity, no laceration and no erythema. Tenderness found. Lateral joint line tenderness noted.   Neurological: Coordination and gait normal.   Skin: He is not diaphoretic.   Linear abrasion present on the lateral aspect of the left knee.  There is mild soft tissue swelling around the abrasion.  There is tenderness to palpation around the soft tissue swelling.   Psychiatric: He has a normal mood and affect. His behavior is normal. Judgment and thought content normal.       Clinical Decision Making:  No deformity, effusion, or significant knee swelling present.  Patient is able to bear weight and ambulates without difficulty.  I have a low suspicion for knee fracture.  I did offer to cleanse the abrasion, but patient said that he is capable of doing that at home.  He mainly came in due to protocol from his work.  He was reassured that that fracture is unlikely and x-ray is unnecessary.  At the end of the encounter, I discussed results, diagnosis, medications. Discussed red flags for immediate return to clinic/ER, as well as indications for follow up if no improvement. Patient understood and agreed to plan. Patient was stable for discharge.    1. Abrasion of left knee, initial encounter           Patient Instructions   1.  Recommend cleansing the wound with gentle soap and water.  Pat dry gently and cover with bandage.  2.  Keep dressing on until healing is more complete, expected 1 week.  Change dressing every 12 hours.  3.  Ice the knee on and off for 20 minutes at a time up to 3 times a day.  4.  Take Tylenol or ibuprofen as needed for pain control.  5.  Follow-up if you are developing any signs of infection or worsening symptoms including increased pain, swelling, redness, or fever.

## 2021-05-29 NOTE — PROGRESS NOTES
"TCM DISCHARGE FOLLOW UP CALL    Discharge Date:  6/1/2019  Reason for hospital stay (discharge diagnosis)::  Prepatellar bursitis, left knee  Are you feeling better, the same or worse since your discharge?:  Patient is feeling better (Still having some pain but incision & knee \"looks good.\"  States this is the 2nd surgery he's had on his knee in the last 2 weeks, and this time looks better than it did after the first surgery.  Afebrile)  Do you feel like you have a plan in the event of a health emergency?: Yes (ER)    As part of your discharge plan, were  home care services ordered for you?: No    Did you receive any new medications, or was there a change to your medications?: Yes    Are you taking those medications, or do you have any established regiment?:  RN reviewed discharge medications w/pt.  Pt states he is taking cephalexin 2 caps three times a day, 2 ES Tylenol three times a day, 600 mg of ibuprofen q 6 hours, 1 vistaril q 6 hours PRN, and 2 oxycodone when he wakes up, 1 in the afternoon ~ 3 pm, and 2 before bed.    Do you have any follow up visits scheduled with your PCP or Specialist?:  Yes, with PCP and Yes, with Specialist (Dr. Rodriguez 6/10/19)  (RN) Is PCP appt scheduled soon enough (within 14 days of discharge date)?: Yes    Who are you seeing and when is it scheduled?:  Ortho 6/11/19, ID 6/12/19      Radha Alexander RN Care Manager, Population Health    "

## 2021-05-29 NOTE — PROGRESS NOTES
Patient ID: Edwin Becker is a 43 y.o. male.  /76   Pulse 79   Temp 98.4  F (36.9  C)   SpO2 98%     Assessment/Plan:                Diagnoses and all orders for this visit:    Prepatellar bursitis of left knee  -     oxyCODONE (ROXICODONE) 5 MG immediate release tablet; Take 1 tablet (5 mg total) by mouth every 8 (eight) hours as needed.  Dispense: 15 tablet; Refill: 0    Anemia, unspecified type    Ulcerative colitis without complications, unspecified location (H)      DISCUSSION  He appears to be improving from his rather protracted course of prepatellar bursitis.  The wound shows no sign of any significant concern at this point and appears to be healing as intended.  He should follow-up with specialty providers as scheduled tomorrow and Wednesday.  Feel there is not a need at this point to recheck hemoglobin as this is likely secondary to the acute illness process.  In the future would consider rechecking.  He will defer questions regarding the dosing of Remicade to the infectious disease provider later this week.  Refill provided for oxycodone, recommend taking 1 tablet or even half tablet nightly for more severe pain.  Continue to use acetaminophen and ibuprofen for pain relief as the main medication treatment taper off of the oxycodone as soon as reasonably possible.  Subjective:     HPI    Edwin Becker is a 43 y.o. male is here today for follow-up of recent hospitalization for prepatellar bursitis requiring incision and drainage.    His medical history significant for ulcerative colitis for which she has been on Remicade.  He also has history of degenerative changes in the hip joints and had undergone surgery in the past couple of years.    His current trouble began in April 2019 when he sustained an injury to the left lateral portion of the knee while at work.  Patient states that a spring coil from a vehicle was in a compressor when it came out and struck him in the knee.  He was seen April  15, 2019 by Dara Toure.  Conservative therapy was recommended at that point in time.    May 4, 2019 he was seen in the emergency department and diagnosed with septic prepatellar bursitis.  He was given a dose of vancomycin in the emergency department and discharged on oral antibiotics.  He was subsequently seen by [facility].  He presented to the hospital emergency department again on May 9, 2019 at the advice of his orthopedic provider.  He underwent aspiration of the area, treatment with intravenous antibiotics and was discharged home on May 11, 2019 with oral Linezolid.  Staph aureus that grew on culture from May 17, 2019.  He was seen in follow-up again after this initial discharge by his orthopedic specialty providers and was recommended to return to the hospital on May 29, 2000 and because of failure to improve.  He underwent irrigation and debridement of the area.  He was seen in consultation with infectious disease.  He ultimately improved enough for discharge on June 1, 2019.  He is now back at home recovering.    He reports he is taking Tylenol and ibuprofen consistently for pain control.  Reports significantly increasing aching type pain that occurs late in the day into the evening which interferes with sleep.  He has run out of his oxycodone.  He feels pain control is not adequate.  Denies any signs or symptoms of infection such as fevers chills or night sweats.  He does feel tired and rundown.  Continues to take the cephalexin as prescribed.  Is elevating the leg when able.  Denies any significant concerns otherwise.    He has follow-up scheduled with orthopedic specialty provider tomorrow and with infectious disease on Wednesday.        Review of Systems  Complete review of systems is obtained.  Other than the specific considerations noted above complete review of systems is negative.          Objective:   Medications:  Current Outpatient Medications   Medication Sig     acetaminophen  (TYLENOL) 500 MG tablet Take 2 tablets (1,000 mg total) by mouth 3 (three) times a day.     cephalexin (KEFLEX) 500 MG capsule Take 2 capsules (1,000 mg total) by mouth 3 (three) times a day for 14 days.     cetirizine (ZYRTEC) 10 MG tablet Take 10 mg by mouth daily as needed for allergies.     ibuprofen (ADVIL) 200 MG tablet Take 3 tablets (600 mg total) by mouth every 6 (six) hours as needed for pain.     senna-docusate (PERICOLACE) 8.6-50 mg tablet Take 1 tablet by mouth 2 (two) times a day.     hydrOXYzine pamoate (VISTARIL) 25 MG capsule Take 1 capsule (25 mg total) by mouth every 6 (six) hours as needed for itching (pain).     oxyCODONE (ROXICODONE) 5 MG immediate release tablet Take 1 tablet (5 mg total) by mouth every 8 (eight) hours as needed.       Allergies:  No Known Allergies    Tobacco:   reports that he quit smoking about 15 years ago. He has never used smokeless tobacco.     Physical Exam          /76   Pulse 79   Temp 98.4  F (36.9  C)   SpO2 98%         General: Patient alert no signs of distress    Examination of his leg reveals that the surgical incision has some dried blood in the inferior portion of dissection is more open but patient states and it appears so that this was done this way initially and intentionally.  The sutures are in place.  There is minimal edema in the area there is no significant redness of the skin.  There is no edema in the lower portion of the leg below the knee joint.  No redness otherwise seen in the leg.  No other sores or lesions within the leg are noted.

## 2021-05-29 NOTE — PROGRESS NOTES
"[facility] INFECTIOUS DISEASE CLINIC FOLLOW UP NOTE    Date: 6/14/2019  Patient Name: Edwin Becker   YOB: 1975  MRN: [id number]      ASSESSMENT:  MSSA left prepatellar bursitis. S/p I+D 5/17. Returned with worsening swelling and had another I+D 5/29 -- operative findings with hematoma and necrotic tissue. Debridement was fairly extensive including to \"subtenons tissue, fascia and overlying quadriceps muscle and VMO.\" Culture negative at that time. Now on cephalexin 1000mg three times a day and there is no outward sign of infection. Labs look good. Infection may be gone. Wound not closed fully to allow drainage. Discussed with Dr. Rodriguez after visit and will plan another week of oral antibiotic due to his complicated course.         PLAN:  Labs today communicated to him after the visit.   Plan another week of cephalexin to be completed around 6/22/19.   Return in 2 weeks.     Richard Zaragoza MD  [facility]   Clinic phone: [phone]   Clinic fax: [phone]    ______________________________________________________________________    SUBJECTIVE / INTERVAL HISTORY: Edwin Becker returns for follow up of MSSA prepatellar bursitis left knee.     He had an superficial injury to the left distal lateral thigh from being hit by a coil spring about 1-2 weeks prior to noting swelling and pain anterior to the right knee. He had bursa aspirated 5/10, culture was negative. He was started on antibiotic, but due to poor response he underwent I+D of bursa with bursectomy on 5/17/19, culture grew MSSA. Recalls that he was on antibiotic for about a week after, probably linezolid from previous hospital stay. Noted increase in swelling, was seen again and admitted for another I+D 5/29. Operative findings showed hematoma, no pus, but there was necrotic tissue. Treated with IV cefazolin in the hospital, and discharged on cephalexin 1000mg three times a day.      Has UC and is treated with " Remicade q6 weeks, is due for next dose. UC is controlled on this. He feels joint pains coming on which is typical when this disease is flaring for him.     Knee pain but improved, looks much better. Tolerating cephalexin. Saw Dr. Nicholas yesterday. Feels that knee is looking better compared to last debridement at this time. ROM is still poor. Taking ibuprofen, tylenol during day, takes oxycodone at night to help sleep.     Past Medical History:   Diagnosis Date     Bursitis     left knee     Ulcerative colitis (H)     on remicade infusion every 6 nweeks      Past Surgical History:   Procedure Laterality Date     Bursectomy Left 05/17/2019    Knee     HIP SURGERY Bilateral     Labral Tear Repair     NE REPAIR UMBILICAL GHISLAINE,<4Y/O,REDUC      Description: Umbilical Hernia Repair;  Recorded: 11/05/2013;     SHOULDER SURGERY Right     RCR         ROS: All other systems negative except as listed above.      Current Outpatient Medications:      acetaminophen (TYLENOL) 500 MG tablet, Take 2 tablets (1,000 mg total) by mouth 3 (three) times a day., Disp: , Rfl: 0     [START ON 6/15/2019] cephalexin (KEFLEX) 500 MG capsule, Take 2 capsules (1,000 mg total) by mouth 3 (three) times a day for 7 days. Start once other prescription runs out, Disp: 42 capsule, Rfl: 0     cetirizine (ZYRTEC) 10 MG tablet, Take 10 mg by mouth daily as needed for allergies., Disp: , Rfl:      ibuprofen (ADVIL) 200 MG tablet, Take 3 tablets (600 mg total) by mouth every 6 (six) hours as needed for pain., Disp: , Rfl: 0     oxyCODONE (ROXICODONE) 5 MG immediate release tablet, Take 1 tablet (5 mg total) by mouth every 8 (eight) hours as needed., Disp: 15 tablet, Rfl: 0     hydrOXYzine pamoate (VISTARIL) 25 MG capsule, Take 1 capsule (25 mg total) by mouth every 6 (six) hours as needed for itching (pain)., Disp: 30 capsule, Rfl: 0     senna-docusate (PERICOLACE) 8.6-50 mg tablet, Take 1 tablet by mouth 2 (two) times a day., Disp: , Rfl:  0      OBJECTIVE:  Vitals:    06/12/19 1001   BP: 112/72   Temp: 97.9  F (36.6  C)         GEN: No acute distress.    RESPIRATORY:  Normal breathing pattern.   CARDIOVASCULAR:  Regular rate and rhythm. Normal S1 and S2.   ABDOMEN:  Soft, normal bowel sounds, non-tender  EXTREMITIES: L knee with incision remaining open as planned. There is what looks like clotted blood toward distal aspect. Mildly tender. No erythema. ROM is limited.   SKIN/HAIR/NAILS:  No rashes          Pertinent labs:    Results from last 7 days   Lab Units 06/12/19  1038   LN-WHITE BLOOD CELL COUNT thou/uL 6.4   LN-HEMOGLOBIN g/dL 15.2   LN-HEMATOCRIT % 44.8   LN-PLATELET COUNT thou/uL 298     Results from last 7 days   Lab Units 06/12/19  1038   LN-SODIUM mmol/L 138   LN-POTASSIUM mmol/L 5.0   LN-CHLORIDE mmol/L 102   LN-CO2 mmol/L 30   LN-BLOOD UREA NITROGEN mg/dL 14   LN-CREATININE mg/dL 0.96   LN-CALCIUM mg/dL 10.4     Lab Results   Component Value Date    CRP 0.2 06/12/2019         Lab Results   Component Value Date    ALT 22 05/10/2019    AST 17 05/10/2019    ALKPHOS 76 05/10/2019    BILITOT 0.3 05/10/2019         MICROBIOLOGY DATA:  MSSA

## 2021-05-29 NOTE — ANESTHESIA CARE TRANSFER NOTE
Last vitals:   Vitals:    05/29/19 1900   BP: 119/72   Pulse: 72   Resp: 12   Temp: 36.3  C (97.4  F)   SpO2: 98%     Patient's level of consciousness is drowsy  Spontaneous respirations: yes  Maintains airway independently: yes  Dentition unchanged: yes  Oropharynx: oral airway in place    QCDR Measures:  ASA# 20 - Surgical Safety Checklist: WHO surgical safety checklist completed prior to induction    PQRS# 430 - Adult PONV Prevention: 4558F - Pt received => 2 anti-emetic agents (different classes) preop & intraop  ASA# 8 - Peds PONV Prevention: NA - Not pediatric patient, not GA or 2 or more risk factors NOT present  PQRS# 424 - Carlyn-op Temp Management: 4559F - At least one body temp DOCUMENTED => 35.5C or 95.9F within required timeframe  PQRS# 426 - PACU Transfer Protocol: - Transfer of care checklist used  ASA# 14 - Acute Post-op Pain: ASA14B - Patient did NOT experience pain >= 7 out of 10

## 2021-05-29 NOTE — ANESTHESIA POSTPROCEDURE EVALUATION
Patient: Edwin Becker  IRRIGATION AND DEBRIDEMENT, POST OPERATIVE WOUND INFECTION  Anesthesia type: general    Patient location: PACU  Last vitals:   Vitals Value Taken Time   /72 5/29/2019  7:56 PM   Temp 36.7  C (98  F) 5/29/2019  7:56 PM   Pulse 73 5/29/2019  8:00 PM   Resp 18 5/29/2019  7:56 PM   SpO2 99 % 5/29/2019  8:00 PM   Vitals shown include unvalidated device data.  Post vital signs: stable  Level of consciousness: awake and responds to simple questions  Post-anesthesia pain: pain controlled  Post-anesthesia nausea and vomiting: no  Pulmonary: unassisted, return to baseline  Cardiovascular: stable and blood pressure at baseline  Hydration: adequate  Anesthetic events: no    QCDR Measures:  ASA# 11 - Carlyn-op Cardiac Arrest: ASA11B - Patient did NOT experience unanticipated cardiac arrest  ASA# 12 - Carlyn-op Mortality Rate: ASA12B - Patient did NOT die  ASA# 13 - PACU Re-Intubation Rate: ASA13B - Patient did NOT require a new airway mgmt  ASA# 10 - Composite Anes Safety: ASA10A - No serious adverse event    Additional Notes:

## 2021-05-29 NOTE — PATIENT INSTRUCTIONS - HE
Continue cephalexin. I will contact you with lab results from today. I will contact Dr. Nicholas also to see what he thinks.     Return in 2 weeks. Please let me know if you have concerns or questions in the meantime.     Richard Zaragoza

## 2021-05-29 NOTE — ANESTHESIA PREPROCEDURE EVALUATION
Anesthesia Evaluation      Patient summary reviewed   No history of anesthetic complications     Airway   Mallampati: II  Neck ROM: full   Pulmonary - normal exam                          Cardiovascular - normal exam   Neuro/Psych      Endo/Other       GI/Hepatic/Renal            Dental - normal exam                        Anesthesia Plan  Planned anesthetic: general LMA    ASA 1   Induction: intravenous   Anesthetic plan and risks discussed with: patient  Anesthesia plan special considerations: antiemetics,   Post-op plan: routine recovery

## 2021-05-30 VITALS — HEIGHT: 74 IN | BODY MASS INDEX: 27.08 KG/M2 | WEIGHT: 211 LBS

## 2021-05-30 VITALS — WEIGHT: 211 LBS | HEIGHT: 73 IN | BODY MASS INDEX: 27.96 KG/M2

## 2021-05-30 NOTE — TELEPHONE ENCOUNTER
Controlled Substance Refill Request  Medication:   Requested Prescriptions     Pending Prescriptions Disp Refills     oxyCODONE (ROXICODONE) 5 MG immediate release tablet 15 tablet 0     Sig: Take 1 tablet (5 mg total) by mouth every 8 (eight) hours as needed.     Date Last Fill: 6/10/19  Pharmacy: [id number]   Submit electronically to pharmacy  Controlled Substance Agreement on File:   Encounter-Level CSA Scan Date:    There are no encounter-level csa scan date.       Last office visit: Last office visit pertaining to requested medication was 6/10/19.

## 2021-05-30 NOTE — PROGRESS NOTES
"[facility] INFECTIOUS DISEASE CLINIC FOLLOW UP NOTE    Date: 6/28/2019  Patient Name: Edwin Becker   YOB: 1975  MRN: [id number]      ASSESSMENT:  1. Remains on treatment for MSSA left prepatellar bursitis. S/p I+D 5/17. Returned with worsening swelling and had another I+D 5/29 -- operative findings with hematoma and necrotic tissue. Debridement was fairly extensive including to \"subtenons tissue, fascia and overlying quadriceps muscle and VMO.\" No indication of knee joint or tibia involvement. Culture negative at that time. Have kept on cephalexin 1000mg three times a day since surgery due to his complicated course. Labs look good. Anterior knee is still healing. Knee range of motion is improved. Advised about monitoring closely for signs of infection returning.   2. Ulcerative colitis. Treated with Remicade.      PLAN:  Labs today communicated to him after the visit.   Finish cephalexin in the next couple of days.  He sees Dr. Nicholas later this week. I can be contacted if there are concerns.   Discussed with him after the visit -- best to remain off opioid for pain management at this point.   If knee is stable to improved over the next 2 weeks he could get Remicade infusion.   He can return to see me as needed.     Richard Zaragoza MD  [facility]   Clinic phone: [phone]   Clinic fax: [phone]    ______________________________________________________________________    SUBJECTIVE / INTERVAL HISTORY: Edwin Becker returns for follow up of MSSA prepatellar bursitis left knee.     He had an superficial injury to the left distal lateral thigh from being hit by a coil spring about 1-2 weeks prior to noting swelling and pain anterior to the right knee. He had bursa aspirated 5/10, culture was negative. He was started on antibiotic, but due to poor response he underwent I+D of bursa with bursectomy on 5/17/19, culture grew MSSA. Recalls that he was on antibiotic for about " a week after, probably linezolid from previous hospital stay. Noted increase in swelling, was seen again and admitted for another I+D 5/29. Operative findings showed hematoma, no pus, but there was necrotic tissue. Treated with IV cefazolin in the hospital, and discharged on cephalexin 1000mg three times a day.      Has UC and is treated with Remicade q6 weeks, but is overdue for this now. UC is controlled on this. He feels joint pains coming on which is typical when this disease is flaring for him.     He remains on cephalexin, has another 1-2 days remaining.     Overall doing ok, feels like knee is getting better. Pain and lack of strength limit his ROM, and this was not an issue between the 2 surgeries. Wound is healing. He has been maintaining on ibuprofen and tylenol, although feels like the oxycodone helps at night, tried to get this through his PCP, but he is on vacation and therefore Valentin hasn't heard back.     Dr. Sheets from GI contacted me this week about having him restart Remicade infusions as he is due. Dr. Sheets was not as concerned about wound healing with the Remicade.     Past Medical History:   Diagnosis Date     Bursitis     left knee     Ulcerative colitis (H)     on remicade infusion every 6 nweeks      Past Surgical History:   Procedure Laterality Date     Bursectomy Left 05/17/2019    Knee     HIP SURGERY Bilateral     Labral Tear Repair     HI REPAIR UMBILICAL GHISLAINE,<6Y/O,REDUC      Description: Umbilical Hernia Repair;  Recorded: 11/05/2013;     SHOULDER SURGERY Right     RCR         ROS: All other systems negative except as listed above.      Current Outpatient Medications:      acetaminophen (TYLENOL) 500 MG tablet, Take 2 tablets (1,000 mg total) by mouth 3 (three) times a day., Disp: , Rfl: 0     cetirizine (ZYRTEC) 10 MG tablet, Take 10 mg by mouth daily as needed for allergies., Disp: , Rfl:      ibuprofen (ADVIL) 200 MG tablet, Take 3 tablets (600 mg total) by mouth every 6 (six)  hours as needed for pain., Disp: , Rfl: 0     hydrOXYzine pamoate (VISTARIL) 25 MG capsule, Take 1 capsule (25 mg total) by mouth every 6 (six) hours as needed for itching (pain)., Disp: 30 capsule, Rfl: 0     oxyCODONE (ROXICODONE) 5 MG immediate release tablet, Take 1 tablet (5 mg total) by mouth every 8 (eight) hours as needed., Disp: 15 tablet, Rfl: 0     senna-docusate (PERICOLACE) 8.6-50 mg tablet, Take 1 tablet by mouth 2 (two) times a day., Disp: , Rfl: 0  Cephalexin 1000mg TID    OBJECTIVE:  Vitals:    06/26/19 1034   BP: 110/74   Temp: 98.3  F (36.8  C)         GEN: No acute distress.    RESPIRATORY:  Normal breathing pattern.   CARDIOVASCULAR:  Regular rate and rhythm.   ABDOMEN:  deferred  EXTREMITIES: L knee with incision healed proximally, with remaining distal aspect filling in compared to last visit. remaining open as planned.  Mildly tender mostly at anterior knee. No bright erythema but some discoloration anterior at knee. ROM improved and there is no pain at the knee joint itself with ROM.   SKIN/HAIR/NAILS:  No rashes          Pertinent labs:    Results from last 7 days   Lab Units 06/26/19  1113   LN-WHITE BLOOD CELL COUNT thou/uL 5.4   LN-HEMOGLOBIN g/dL 15.6   LN-HEMATOCRIT % 46.5   LN-PLATELET COUNT thou/uL 195     Results from last 7 days   Lab Units 06/26/19  1113   LN-CREATININE mg/dL 0.86     Lab Results   Component Value Date    CRP <0.1 06/26/2019         Lab Results   Component Value Date    ALT 22 05/10/2019    AST 17 05/10/2019    ALKPHOS 76 05/10/2019    BILITOT 0.3 05/10/2019         MICROBIOLOGY DATA:  MSSA

## 2021-05-30 NOTE — PATIENT INSTRUCTIONS - HE
You can finish your current supply of the antibiotic. I can be in touch with Dr. Nicholas after he sees you tomorrow. I will contact you with lab results from today.     Let me know if you have concerns -- increased pain, redness, fever, etc.     If no signs of infection over the next two weeks, you could get your next Remicade infusion, and just contact Dr. Sheets about this.     Please let me know if you have other concerns or questions.     Richard Zaragoza

## 2021-05-31 VITALS — WEIGHT: 204 LBS | BODY MASS INDEX: 26.91 KG/M2

## 2021-05-31 VITALS — BODY MASS INDEX: 25.86 KG/M2 | WEIGHT: 196 LBS

## 2021-05-31 VITALS — WEIGHT: 192 LBS | BODY MASS INDEX: 25.33 KG/M2

## 2021-05-31 NOTE — PROGRESS NOTES
Patient ID: Edwin Becker is a 44 y.o. male.  /72   Wt 205 lb (93 kg)   BMI 27.05 kg/m      Assessment/Plan:                Diagnoses and all orders for this visit:    Prepatellar bursitis of left knee    Ulcerative colitis without complications, unspecified location (H)    Dysfunction of right eustachian tube      DISCUSSION  No further follow-up required for prepatellar bursitis reviewed clinical course as discussed below.  Recommend trying nasal steroid and/or oral antihistamine for management of eustachian tube dysfunction.  Subjective:     HPI    Edwin Becker is a 44 y.o. male who is here today to follow-up on prepatellar bursitis of the left knee.  I seen him back in June and we elected to follow-up in 2 months to reassess his situation.  He had had a very complex course this past spring and early summer requiring hospitalization and surgical treatment.  At this point in time patient reports significant improvement in his symptoms.  He no longer has significant pain is returned to most normal activities.  There is still some very superficial areas within the incision site that tend to weep serous fluid on occasion but is otherwise healed.  He has been cleared by his infectious disease specialist and no longer requires follow-up.  He has been cleared by his orthopedic specialist and no longer requires follow-up.  He is restarted on Remicade infusions for his ulcerative colitis.  States that this has helped tremendously with symptoms of ulcerative colitis.  He reports no significant concerns in that regard at this point he follows closely with gastroenterology.  Reviewed his clinical course in depth.    He reports more acutely he has had some plugging intermittently of his right ear.  We discussed this.  Denies any pain no vertigo no ear drainage has had some nasal congestion allergy type symptoms.    Review of Systems  Complete review of systems is obtained.  Other than the specific considerations  Patient presented for DOT medical examination.  Patient failed the vision test with 20/50 in the right eye.  Patient is to come in with vision correction in order to pass examination.  See scanned documents.   noted above complete review of systems is negative.      Objective:   Medications:  Current Outpatient Medications   Medication Sig     ibuprofen (ADVIL) 200 MG tablet Take 3 tablets (600 mg total) by mouth every 6 (six) hours as needed for pain.     inFLIXimab (REMICADE) 100 mg injection Infuse 5 mg/kg into a venous catheter.     acetaminophen (TYLENOL) 500 MG tablet Take 2 tablets (1,000 mg total) by mouth 3 (three) times a day.     cetirizine (ZYRTEC) 10 MG tablet Take 10 mg by mouth daily as needed for allergies.     hydrOXYzine pamoate (VISTARIL) 25 MG capsule Take 1 capsule (25 mg total) by mouth every 6 (six) hours as needed for itching (pain).     oxyCODONE (ROXICODONE) 5 MG immediate release tablet Take 1 tablet (5 mg total) by mouth every 8 (eight) hours as needed.     senna-docusate (PERICOLACE) 8.6-50 mg tablet Take 1 tablet by mouth 2 (two) times a day.     Allergies:  No Known Allergies    Tobacco:   reports that he quit smoking about 16 years ago. He has never used smokeless tobacco.     Physical Exam      /72   Wt 205 lb (93 kg)   BMI 27.05 kg/m      General Appearance:    Alert, cooperative, no distress   Eyes:   No scleral icterus or conjunctival irritation       Ears:    Normal TM's and external ear canals, both ears   Throat:   Lips, mucosa, and tongue normal; teeth and gums normal   Neck:   Supple, symmetrical, trachea midline, no adenopathy;        thyroid:  No enlargement/tenderness/nodules   Lungs:     Clear to auscultation bilaterally, respirations unlabored, no wheezes or crackles   Heart:    Regular rate and rhythm,  No murmur   Abdomen:    Soft, no distention, no tenderness on palpation, no masses, no organomegaly     Extremities:  No edema, no joint swelling or redness, no evidence of any injuries   Skin:  No concerning skin findings, no suspicious moles, no rashes   Neurologic:  On gross examination there is no motor or sensory deficit.  Patient walks with a normal gait

## 2021-06-01 ENCOUNTER — RECORDS - HEALTHEAST (OUTPATIENT)
Dept: ADMINISTRATIVE | Facility: OTHER | Age: 46
End: 2021-06-01

## 2021-06-01 VITALS — WEIGHT: 191 LBS | BODY MASS INDEX: 25.2 KG/M2

## 2021-06-01 LAB
ALT SERPL W/O P-5'-P-CCNC: 46 U/L (ref 0–78)
AST SERPL-CCNC: 26 U/L (ref 0–37)

## 2021-06-02 ENCOUNTER — RECORDS - HEALTHEAST (OUTPATIENT)
Dept: ADMINISTRATIVE | Facility: CLINIC | Age: 46
End: 2021-06-02

## 2021-06-02 VITALS — HEIGHT: 74 IN | BODY MASS INDEX: 25.49 KG/M2 | WEIGHT: 198.6 LBS

## 2021-06-03 ENCOUNTER — RECORDS - HEALTHEAST (OUTPATIENT)
Dept: ADMINISTRATIVE | Facility: CLINIC | Age: 46
End: 2021-06-03

## 2021-06-03 VITALS — BODY MASS INDEX: 26.35 KG/M2 | WEIGHT: 198.8 LBS | HEIGHT: 73 IN

## 2021-06-03 VITALS — BODY MASS INDEX: 26.69 KG/M2 | WEIGHT: 208 LBS | HEIGHT: 74 IN

## 2021-06-03 VITALS
TEMPERATURE: 97.6 F | DIASTOLIC BLOOD PRESSURE: 72 MMHG | SYSTOLIC BLOOD PRESSURE: 118 MMHG | WEIGHT: 212 LBS | OXYGEN SATURATION: 98 % | BODY MASS INDEX: 27.97 KG/M2 | HEART RATE: 72 BPM

## 2021-06-03 VITALS — WEIGHT: 205 LBS | BODY MASS INDEX: 27.17 KG/M2 | HEIGHT: 73 IN

## 2021-06-03 VITALS — BODY MASS INDEX: 27.05 KG/M2 | WEIGHT: 205 LBS

## 2021-06-03 VITALS — BODY MASS INDEX: 27.33 KG/M2 | WEIGHT: 210 LBS

## 2021-06-03 NOTE — PROGRESS NOTES
Patient ID: Edwin Becker is a 44 y.o. male.  /72   Pulse 72   Temp 97.6  F (36.4  C)   Wt 212 lb (96.2 kg)   SpO2 98%   BMI 27.97 kg/m      Assessment/Plan:                Diagnoses and all orders for this visit:    Ulcerative colitis without complications, unspecified location (H)  -     HM1(CBC and Differential)  -     Comprehensive Metabolic Panel  -     Vitamin D, Total (25-Hydroxy)  -     Vitamin B12  -     Celiac(Gluten)Antibody Panel ($$$)  -     HM1 (CBC with Diff)    Malaise  -     HM1(CBC and Differential)  -     Comprehensive Metabolic Panel  -     CK Total  -     HM1 (CBC with Diff)    Fatigue, unspecified type  -     HM1(CBC and Differential)  -     Comprehensive Metabolic Panel  -     Thyroid Cascade  -     HM1 (CBC with Diff)    Arthralgia of both knees  -     C-Reactive Protein(CRP)    Pain of both hip joints  -     C-Reactive Protein(CRP)    Pain in joint of right shoulder  -     C-Reactive Protein(CRP)      DISCUSSION  Clear etiology for his symptoms.  He has multiple different areas of joint pain and stiffness which may very well be related to his occupation as an .  There is nothing that would suggest that he would have something such as inflammatory or autoimmune type arthritis.  Certainly being on Remicade would likely be a treatment for such a consideration.  He is taking higher than recommended doses of ibuprofen.  Ibuprofen may be causing gastrointestinal irritation that is not fully appreciated.  There may be some other process going on.  We will engage in laboratory testing as noted above.  Patient is encouraged to refrain from ibuprofen for the time being.  Based on results of testing will decide on further course of action.  Patient will keep his follow-up as scheduled with his gastroenterologist for colonoscopy at this point in time.  Based on testing will decide if we need to make any more immediate referral.  Subjective:     HPI    Edwin Becker is a 44  y.o. male he was diagnosed with ulcerative colitis 16 years ago and is on Remicade treatment.  This past year underwent extensive antibiotic therapy for infected left knee bursitis.  He recovered from his knee bursitis and is no issue there.  He reports undergoing Remicade treatment about 4 weeks ago.  Feels that overall his ulcerative colitis is under good control.    He is here today complaining that he just has general malaise, fatigue and arthralgias that have been going on and been bothersome since August 2019.  He states he is taking ibuprofen.  When pressed patient states he takes up to 1000 mg at a time usually just once per day because he wakes up feels that he is stiffness in his back and multiple other joints including knees hips shoulder sometimes elbows and feet.  Taking ibuprofen usually helps to alleviate this.  Patient reports that he has some loose stool and some abdominal discomfort which she feels is relatively normal for his underlying ulcerative colitis.  He is scheduled for follow-up with his gastroenterologist and is due for a colonoscopy which is scheduled in early December.  He notes no hematochezia hematemesis melena or other significant concerns.  His weight is actually gone upward slightly compared to previous numbers.  He reports no nausea.  No vomiting.  Denies shortness of breath or chest pain does have occasional cough and other mild allergy type symptoms.    Review of Systems  Complete review of systems is obtained.  Other than the specific considerations noted above complete review of systems is negative.        Objective:   Medications:  Current Outpatient Medications   Medication Sig     acetaminophen (TYLENOL) 500 MG tablet Take 2 tablets (1,000 mg total) by mouth 3 (three) times a day.     cetirizine (ZYRTEC) 10 MG tablet Take 10 mg by mouth daily as needed for allergies.     hydrOXYzine pamoate (VISTARIL) 25 MG capsule Take 1 capsule (25 mg total) by mouth every 6 (six) hours  as needed for itching (pain).     ibuprofen (ADVIL) 200 MG tablet Take 3 tablets (600 mg total) by mouth every 6 (six) hours as needed for pain.     inFLIXimab (REMICADE) 100 mg injection Infuse 5 mg/kg into a venous catheter.     oxyCODONE (ROXICODONE) 5 MG immediate release tablet Take 1 tablet (5 mg total) by mouth every 8 (eight) hours as needed.     senna-docusate (PERICOLACE) 8.6-50 mg tablet Take 1 tablet by mouth 2 (two) times a day.     Allergies:  No Known Allergies    Tobacco:   reports that he quit smoking about 16 years ago. He has never used smokeless tobacco.     Physical Exam      /72   Pulse 72   Temp 97.6  F (36.4  C)   Wt 212 lb (96.2 kg)   SpO2 98%   BMI 27.97 kg/m        General: Patient alert no signs of distress    HEENT: No conjunctival irritation, no scleral icterus, no lesions in the mouth or pharynx, neck is supple without lymphadenopathy, to be no movements are both clear.    Lungs: Good air movement throughout no wheeze or crackle    Heart: Regular rate and rhythm no murmur    Abdomen: Soft nondistended nontender no organomegaly no masses.  Patient reports tenderness in several areas most prominent in the epigastric region and right lower quadrant.  No rebound tenderness no guarding    Extremities: Warm without edema    Skin: No suspicious skin rashes

## 2021-06-03 NOTE — TELEPHONE ENCOUNTER
Name of form/paperwork: Other:  Health Care Provider Report  Have you been seen for this request: Yes:  11/05/2019  Do we have the form: Caller will be faxing this to [phone].  When is form needed by: ASAP  How would you like the form returned: fax   Fax Number: [phone]  Patient Notified form requests are processed in 3-5 business days: Yes  (If patient needs form sooner, please note that in this message.)  Okay to leave a detailed message? No

## 2021-06-04 VITALS — BODY MASS INDEX: 27.05 KG/M2 | SYSTOLIC BLOOD PRESSURE: 122 MMHG | WEIGHT: 205 LBS | DIASTOLIC BLOOD PRESSURE: 72 MMHG

## 2021-06-06 NOTE — TELEPHONE ENCOUNTER
Controlled Substance Refill Request  Medication Name:   Requested Prescriptions     Pending Prescriptions Disp Refills     oxyCODONE (ROXICODONE) 5 MG immediate release tablet 10 tablet 0     Sig: Take 1 tablet (5 mg total) by mouth every 8 (eight) hours as needed.     Date Last Fill: 6/28/19  [facility]  Submit electronically to pharmacy  Controlled Substance Agreement on file:   Encounter-Level CSA Scan Date:    There are no encounter-level csa scan date.        Last office visit:  3/9/20

## 2021-06-06 NOTE — PROGRESS NOTES
Patient ID: Edwin Becker is a 44 y.o. male.  /72   Wt 205 lb (93 kg)   BMI 27.05 kg/m      Assessment/Plan:                   Diagnoses and all orders for this visit:    Hip pain, right  -     Ambulatory referral to Orthopedic Surgery    Chronic elbow pain, right    Other orders  -     Cancel: HIV Antigen/Antibody Screening [facility]        DISCUSSION  His elbow symptoms are not typical epicondylitis although there might be some mild irritation in this regard.  His elbow symptoms likely are more specific to the joint and likely representative of an overuse phenomenon that occasionally flares up.  We discussed options as to how to approach this but he will continue to treat this himself with more conservative care for the time being.    His hip is more bothersome.  He does not wish to pursue any surgical intervention even if it were to be indicated at this point in time.  He is wondering about other therapies such as corticosteroid injection.  Given the complexity of his situation we discussed referral to orthopedic specialty provider.  Subjective:     HPI    Edwin Becker is a 44 y.o. male his medical history includes ulcerative colitis for which she is on Remicade and a history of infected left knee bursitis requiring prolonged hospitalization and antibiotic treatment over a year ago.  Patient reports he is currently doing well but continues to deal with some musculoskeletal issues that he would like to discuss today.    He reports chronic right elbow pain.  Patient reports periodic flareups of more significant pain and discomfort along with stiffness.  Patient denies any significant injury to the elbow.  He has a prior history of shoulder surgery.  Patient states that he played baseball up through about age 22.  He was a pitcher.  Patient attributes just being a pitcher as being 1 of the reasons that he has issues with his elbow.  He denies numbness or tingling.  He reports pain primarily in the area  around the olecranon.  Some more mild discomfort around the epicondyles both medially and laterally.  Patient does work as a .    He has a history of a labral tear in the right hip.  He had surgical treatment in the past.  Patient denies any new injury but is having worsening right hip pain.  This is bothering him more and more especially doing normal activities.  Patient ports pain primarily in the anterior medial portion of the hip region with some radiation of pain into the area of the thigh.  He denies any numbness or tingling or weakness of the musculature in the leg.  He does note more stiffness in the joint.  The discomfort seems to be most pronounced at nighttime.    Review of Systems  Complete review of systems is obtained.  Other than the specific considerations noted above complete review of systems is negative.        Objective:   Medications:  Current Outpatient Medications   Medication Sig     ibuprofen (ADVIL) 200 MG tablet Take 3 tablets (600 mg total) by mouth every 6 (six) hours as needed for pain.     inFLIXimab (REMICADE) 100 mg injection Infuse 5 mg/kg into a venous catheter.     acetaminophen (TYLENOL) 500 MG tablet Take 2 tablets (1,000 mg total) by mouth 3 (three) times a day.     cetirizine (ZYRTEC) 10 MG tablet Take 10 mg by mouth daily as needed for allergies.     hydrOXYzine pamoate (VISTARIL) 25 MG capsule Take 1 capsule (25 mg total) by mouth every 6 (six) hours as needed for itching (pain).     oxyCODONE (ROXICODONE) 5 MG immediate release tablet Take 1 tablet (5 mg total) by mouth every 8 (eight) hours as needed.     predniSONE (DELTASONE) 10 mg tablet      senna-docusate (PERICOLACE) 8.6-50 mg tablet Take 1 tablet by mouth 2 (two) times a day.     Allergies:  No Known Allergies    Tobacco:   reports that he quit smoking about 16 years ago. He has never used smokeless tobacco.     Physical Exam      /72   Wt 205 lb (93 kg)   BMI 27.05 kg/m        General: Patient alert  no signs of distress    Examination of his right elbow reveals full range of motion although patient reports some difficulty with full extension of the elbow with a feeling of mild discomfort and stiffness.  There is some focal swelling around the olecranon.  He denies numbness or tingling.  He has good strength and movement more distally in the wrist and fingers.  Resisted wrist flexion and extension cause only minimal discomfort at the medial and lateral epicondyle.  There is no pain on palpation around the epicondyles.    Examination of his hip reveals pain and stiffness with movement.  He does have good external rotation although has some discomfort and stiffness in performing this maneuver.  Internal rotation is more limited probably more moderate in its limitation and does cause some discomfort.  With hip flexion he reports discomfort but there is no appreciable weakness.  No pain on palpation over the trochanter.

## 2021-06-07 ENCOUNTER — RECORDS - HEALTHEAST (OUTPATIENT)
Dept: HEALTH INFORMATION MANAGEMENT | Facility: CLINIC | Age: 46
End: 2021-06-07

## 2021-06-07 NOTE — PROGRESS NOTES
"Edwin Becker is a 44 y.o. male who is being evaluated via a billable telephone visit.      The patient has been notified of following:     \"This telephone visit will be conducted via a call between you and your physician/provider. We have found that certain health care needs can be provided without the need for a physical exam.  This service lets us provide the care you need with a short phone conversation.  If a prescription is necessary we can send it directly to your pharmacy.  If lab work is needed we can place an order for that and you can then stop by our lab to have the test done at a later time.    Telephone visits are billed at different rates depending on your insurance coverage. During this emergency period, for some insurers they may be billed the same as an in-person visit.  Please reach out to your insurance provider with any questions.    If during the course of the call the physician/provider feels a telephone visit is not appropriate, you will not be charged for this service.\"    Patient has given verbal consent to a Telephone visit? Yes    Patient would like to receive their AVS by AVS Preference: Marcial.    Assessment/Plan:    1. Prepatellar bursitis of left knee  Unfortunately, due to circumstances surrounding coronavirus pandemic, patient is unable to be evaluated by orthopedics for his hip pain.  I feel patient would be a good candidate for steroid injection when able to schedule visit with orthopedics due to his history. In the meantime, will provide pain medication to use for severe, breakthrough pain only.  We discussed that this is not an ideal situation for pain management and will not be an appropriate long-term solution.  He will continue with extra strength Tylenol and ibuprofen and use oxycodone 5 mg sparingly as needed for breakthrough pain.  He should follow-up in no later than 2 weeks to reassess symptoms.  - oxyCODONE (ROXICODONE) 5 MG immediate release tablet; Take 1 tablet (5 " mg total) by mouth every 8 (eight) hours as needed.  Dispense: 10 tablet; Refill: 0    Subjective:    dEwin Becker is a 44-year-old male here today for a phone visit to discuss hip pain.  Patient has a long history of hip pain.  He had a labral tear of the right hip with surgical treatment in the past.  Has not had any new injury but over the last several months has been experiencing worsening hip pain.  Patient was seen in clinic roughly 1 month ago for evaluation of the pain.  At that time, he had a referral placed to orthopedics for a possible corticosteroid injection.  Unfortunately, due to coronavirus pandemic he has been unable to schedule any type of appointment with orthopedics at this time.  He has been taking extra strength Tylenol and ibuprofen for pain management which she does not feel is treating the pain adequately.  He rates the pain a 7 out of 10, sometimes worsening depending on how much he is moving.  He denies any weakness of his legs.  No numbness or tingling sensation.  Occasionally the right hip joint feels more stiff.  Patient continues to work as an .  States that this is difficult when he is in so much pain.  Review of systems is as stated in HPI, and the remainder of the 10 system review is otherwise unremarkable.    Past Medical History, Family History, and Social History reviewed.    Past Surgical History:   Procedure Laterality Date     Bursectomy Left 05/17/2019    Knee     HIP SURGERY Bilateral     Labral Tear Repair     MA REPAIR UMBILICAL GHISLAINE,<4Y/O,REDUC      Description: Umbilical Hernia Repair;  Recorded: 11/05/2013;     SHOULDER SURGERY Right     RCR        Family History   Problem Relation Age of Onset     No Medical Problems Mother      No Medical Problems Father         Past Medical History:   Diagnosis Date     Bursitis     left knee     Ulcerative colitis (H)     on remicade infusion every 6 nweeks        Social History     Tobacco Use     Smoking status:  Former Smoker     Last attempt to quit: 2003     Years since quittin.7     Smokeless tobacco: Never Used   Substance Use Topics     Alcohol use: No     Drug use: No        Current Outpatient Medications   Medication Sig Dispense Refill     inFLIXimab (REMICADE) 100 mg injection Infuse 5 mg/kg into a venous catheter.       acetaminophen (TYLENOL) 500 MG tablet Take 2 tablets (1,000 mg total) by mouth 3 (three) times a day.  0     ibuprofen (ADVIL) 200 MG tablet Take 3 tablets (600 mg total) by mouth every 6 (six) hours as needed for pain.  0     oxyCODONE (ROXICODONE) 5 MG immediate release tablet Take 1 tablet (5 mg total) by mouth every 8 (eight) hours as needed. 10 tablet 0     No current facility-administered medications for this visit.         This note has been dictated using voice recognition software. Any grammatical or context distortions are unintentional and inherent to the use of this software.     Phone call duration: 10 minutes    Radha Laws, CNP   Electronically signed by Maria Victoria Schuster MD  (Interpreting physician) on 10/12/2018 at 04:57 PM  ------------------------------------------------------------------  Patient Status:ER. Study [facility]  - Vascular Lab. Technical Quality:Good visualization.   - Results were reported to:Spoke to ER. Risk Factors   - The patient's risk factor(s) include: arterial hypertension.   - The patient has a former tobacco history. Velocities are measured in cm/s ; Diameters are measured in mm Right Lower Extremities DVT Study Measurements Right 2D Measurements +--------------+----------+---------------+----------+ ! Location      ! Visualized! Compressibility! Thrombosis! +--------------+----------+---------------+----------+ ! Common Femoral!Yes       ! Yes            ! None      ! +--------------+----------+---------------+----------+ Right Doppler Measurements +--------------+------+------+------------+ ! Location      ! Signal!Reflux! Reflux (sec)! +--------------+------+------+------------+ ! Common Femoral!Phasic!      !            ! +--------------+------+------+------------+ Left Lower Extremities DVT Study Measurements Left 2D Measurements +------------------------+----------+---------------+----------+ ! Location                ! Visualized! Compressibility! Thrombosis! +------------------------+----------+---------------+----------+ ! Sapheno Femoral Junction! Yes       ! Yes            ! None      ! +------------------------+----------+---------------+----------+ ! GSV Thigh               ! Yes       ! Yes            ! None      ! +------------------------+----------+---------------+----------+ ! Common Femoral          !Yes       ! Yes            ! None      ! +------------------------+----------+---------------+----------+ ! Prox Femoral            !Yes       ! Yes            ! None      ! +------------------------+----------+---------------+----------+ ! Mid Femoral             !Yes       ! Yes            ! None      ! data to display        Emergency room course: Patient on exam of the left lower extremity shows hip nontender knee shows some mild tenderness posteriorly. No erythema no warmth. Some pain with flexion extension. Mainly with weightbearing. No obvious deformity. Negative anterior drawer sign. Negative varus and valgus maneuver. Show no swelling. No erythema. Does have some mild tenderness to the lower third of the posterior tibia. Achilles nontender. Plantar dorsi flexion. Pedal pulses are 2+ capillary refill less than 2 seconds all digits nontender metatarsals. No swelling in his foot. Venous Doppler showed no evidence of DVT. At this time I did discuss patient physical exam finding as well as venous Doppler results with him and his wife. He'll be discharged follow his primary care physician follow-up with his orthopedics. Patient was okay with taking OTC Motrin or Tylenol as needed for pain. He'll be started stable condition. The patient tolerated their visit well. I evaluated the patient. The physician was available for consultation as needed. The patient and / or the family were informed of the results of anytests, a time was given to answer questions, a plan was proposed and they agreed with plan. CLINICAL IMPRESSION:  1. Left knee pain, unspecified chronicity        DISPOSITION  DISPOSITION Decision To Discharge 10/12/2018 05:17:57 PM          PATIENT REFERRED TO:  No follow-up provider specified. DISCHARGE MEDICATIONS:  There are no discharge medications for this patient. DISCONTINUED MEDICATIONS:  There are no discharge medications for this patient.              (Please note the MDM and HPI sections of this note were completed with a voice recognition program.  Efforts weremade to edit the dictations but occasionally words are mis-transcribed.)    Electronically signed, Megan Evans PA-C,          Megan Evans PA-C  10/18/18 [id number]

## 2021-06-08 NOTE — PROGRESS NOTES
Seen with son.  Hx of ulcerative colitis on remicade.  2 week hx of increasing cough,no current fever.  Hx of walking pneumonia.  Family is also sick.  Lungs without rhonchi, crackles or wheezes.   NKDA.  Started on azithromycin. Follow up with worsening cough SOB or fever.

## 2021-06-09 NOTE — PROGRESS NOTES
Assessment/Plan:     1. Hip pain, right  Hip x-ray done today in the office which I personally reviewed and interpreted showing no significant bony abnormality there is a density on the medial side of the lesser trochanter.  Discussed further workup and treatment with the patient including recommending physical therapy which she declines.  He would like to try injections as a worked well for his shoulder in the past.  Ejection placed in the office see note below.  May consider further imaging with MRI or CT to determine what the density is based on radiologist interpretation and if patient feels improved after imaging.  - lidocaine 10 mg/mL (1 %) injection 1 mL; Inject 1 mL into the joint once.  - methylPREDNISolone acetate injection 80 mg (DEPO-MEDROL); Inject 1 mL (80 mg total) into the joint once.    2. Bronchitis  Did get some relief from albuterol treatment in office.  No indication for needing more antibiotic likely postinflammatory.  Will also add prednisone per patient's request.  - albuterol nebulizer solution 3 mL (PROVENTIL); Take 3 mL by nebulization once.  - methylPREDNISolone (MEDROL DOSEPACK) 4 mg tablet; Take 1 tablet (4 mg total) by mouth daily. follow package directions  Dispense: 21 tablet; Refill: 0  - albuterol (PROVENTIL HFA;VENTOLIN HFA) 90 mcg/actuation inhaler; Inhale 2 puffs every 6 (six) hours as needed.  Dispense: 1 Inhaler; Refill: 0    3. Sebaceous cyst  No acute inflammation.  Discussed removing the cyst at this time but patient declines this.    4. Cervicalgia  Refilled per patient's request.  He understands that he needs to take the medication only with significant pain and if he requires further ongoing refills would recommend follow-up with primary care to discuss ongoing pain control.  He declines further workup with spine care.  - cyclobenzaprine (FLEXERIL) 5 MG tablet; Take 1-2 tablets (5-10 mg total) by mouth 3 (three) times a day as needed for muscle spasms.  Dispense: 45  tablet; Refill: 0  - oxyCODONE-acetaminophen (PERCOCET) 5-325 mg per tablet; Take 1 tablet by mouth every 6 (six) hours as needed for pain.  Dispense: 45 tablet; Refill: 0            Procedure: Right hip injection injection    The risks and benefits of the procedure were discussed with the patient and he elected to proceed with the procedure and gave verbal consent.    A time out was performed, verifying the patient, type of injection and laterality.    The area over the right greater trochanter was marked by indenting the skin (no marker was used).  The skin over this was then cleansed with iodine.  Using a no touch technique, a needle was inserted down to the bone and then withdrawn 2-3 mm.  After aspiration was negative a solution consisting of 1 mL of 80 mg of depomedrol mixed with 1 mL of 1% Lidocaine was injected.  There was no resistance with injection.    There was no bleeding.  A bandaid was placed over the injection site.    Afterwards the patient was immediately able to stand on his own power and noted immediate relief.  Advised the patient that he may shower but should not submerge the right hip area in water.  If he has any redness/drainage/swelling over the injection site, or any fever/chills, he is asked to call the clinic immediately or go to the nearest Emergency room.    Total time spent was 45 minutes, >50% spent discussing treatment options noted above, counseling and coordination of care.   Subjective:      Edwin Becker is a 41 y.o. male comes in today with multiple concerns.  Been several months since we have seen him last.  In the interim he has followed up with spine care.  Reviewed the visit note.  They gave him diclofenac and gabapentin but he states he did not even get them filled as he does not think they be helpful.  He does have ongoing spine pain and is known to have some stenosis in her cervical spine.  It was recommended that he have some injections but he has declined this at this  time.  He is wondering if he can have a refill of the muscle relaxer and oxycodone.  States he uses it sparingly.  Has not had a refill for several months.  Does not use it while work or driving but has a physical job and sometimes the pain will bother him after work.  He also has concerns with right hip pain.  This has been going on intermittently for the last 2 years but is getting worse he states that he feels the pain on the right lateral side and somewhat in the front he thinks it may involve his hip flexors.  He states it is worse when he sits for long period of time and it feels sore if he is a long car ride.  He feels his range of motion is okay it has not limited his activity he can still work and has been playing hockey.  He states that he is concerned because money of his family members have had to have a hip replacement and so he would like this evaluated he would like a steroid injection if possible at because this did seem to help the shoulder pain that he has had in the past.  He is unsure if any of the other medications he has been using for his neck has helped the hip pain.  He feels some soreness today but states it feels rather mild.  He has no numbness or tingling or any radiation of pain down his leg or in his back.  He has no major traumas that he can recall but again has been very active and has played hockey so he states there is a possibility.  Nothing more recent.  He also is concerned about a cyst on the back of his neck.  He states it is probably been there for about a year.  He states that 2 days ago it was the size of a pea and his wife popped it and a thick white material that resembled frosting came out.  He states that it has significantly decreased he states this happened before and it will flareup and then go back down.  He is wondering what can be done for treatment for this today.  Is not significantly painful for him today.  Lastly he has concerns about bronchitis.  He states  that he has been sick for the last couple weeks he states that he was given Zithromax from someone and took the antibiotic he was not seen by a healthcare provider.  He states that he felt significantly better after that but he has a somewhat barky cough and runny nose feels some congestion in his lungs and that he gets short of breath with activity.  He has had albuterol inhalers before in the past with infections and this has helped.  He states he also has a significantly runny nose.  No sinus pain or pressure no recent fevers.  No new GI side effects.  No other new concerns today    Current Outpatient Prescriptions   Medication Sig Dispense Refill     ferrous sulfate 325 (65 FE) MG tablet Take 1 tablet by mouth daily with breakfast.       fluticasone (FLONASE) 50 mcg/actuation nasal spray INSTILL TWO SPRAYS INTO EACH NOSTRILS DAILY. 48 g 3     INFLIXIMAB (REMICADE IV) SOLR. Use as directed.       albuterol (PROVENTIL HFA;VENTOLIN HFA) 90 mcg/actuation inhaler Inhale 2 puffs every 6 (six) hours as needed. 1 Inhaler 0     cyclobenzaprine (FLEXERIL) 5 MG tablet Take 1-2 tablets (5-10 mg total) by mouth 3 (three) times a day as needed for muscle spasms. 45 tablet 0     methylPREDNISolone (MEDROL DOSEPACK) 4 mg tablet Take 1 tablet (4 mg total) by mouth daily. follow package directions 21 tablet 0     oxyCODONE-acetaminophen (PERCOCET) 5-325 mg per tablet Take 1 tablet by mouth every 6 (six) hours as needed for pain. 45 tablet 0     No current facility-administered medications for this visit.        Past Medical History, Family History, and Social History reviewed.  No past medical history on file.  Past Surgical History:   Procedure Laterality Date     [place] REPAIR UMBILICAL GHISLAINE,<4Y/O,REDUC      Description: Umbilical Hernia Repair;  Recorded: 11/05/2013;     Review of patient's allergies indicates no known allergies.  Family History   Problem Relation Age of Onset     No Medical Problems Mother      No Medical  "Problems Father      Social History     Social History     Marital status:      Spouse name: N/A     Number of children: N/A     Years of education: N/A     Occupational History     Not on file.     Social History Main Topics     Smoking status: Former Smoker     Quit date: 7/27/2003     Smokeless tobacco: Never Used     Alcohol use No     Drug use: No     Sexual activity: Not on file     Other Topics Concern     Not on file     Social History Narrative         Review of systems is as stated in HPI, and the remainder of the 10 system review is otherwise negative.    Objective:     Vitals:    03/01/17 1339   BP: 108/70   Patient Site: Left Arm   Patient Position: Sitting   Cuff Size: Adult Large   Pulse: 88   Weight: 211 lb (95.7 kg)   Height: 6' 1.5\" (1.867 m)    Body mass index is 27.46 kg/(m^2).    General Appearance:    Alert, cooperative, no distress, appears stated age   Head:    Normocephalic, without obvious abnormality, atraumatic   Eyes:    PERRL, no conjunctivitis    Ears:    Normal TM's and external ear canals   Nose:   Mucosa normal, clear rhinorrhea, no sinus tenderness   Throat:   Oropharynx is clear   Neck:   Supple, symmetrical, no adenopathy, no thyromegally       Lungs:    right side mild wheeze which improved after an office albuterol treatment.  Otherwise clear to auscultation bilaterally, respirations unlabored   Chest Wall:    No tenderness or deformity    Heart:    Regular rate and rhythm, S1 and S2 normal, no murmur, rub    or gallop       Abdomen:     Soft, non-tender, bowel sounds active all four quadrants,     no masses, no organomegaly           Extremities:  patient has normal range of motion but some tenderness with both hip flexion and extension.  No significant tenderness the greater trochanter area.  No significant sacroiliac tenderness or inflammation.  Otherwise extremities normal, atraumatic, no cyanosis or edema   Pulses:   2+ and symmetric all extremities   Skin:  " sebaceous cyst on the back of the neck just under 1 cm.           This note has been dictated using voice recognition software. Any grammatical or context distortions are unintentional and inherent to the the software.

## 2021-06-10 NOTE — PROGRESS NOTES
Assessment/Plan:     1. Preop examination  2. Labral tear of hip, degenerative  Overall patient is low surgical risk and may proceed at discretion of surgeon and anesthesiology team.  Patient requests refill of pain medication to take prior to procedure.  Informed patient that generally in the postoperative period surgeon would handle pain control.  Labs were not drawn today as patient had recent labs done from gastroenterologist and results are attached.  Hemoglobin on March 31, 2017 was 15.8.  - oxyCODONE-acetaminophen (PERCOCET)  mg per tablet; Take 1 tablet by mouth every 8 (eight) hours as needed for pain.  Dispense: 30 tablet; Refill: 0      Subjective:      Edwin Becker is a 41 y.o. male comes in for preoperative examination    Planned procedure: Labrum repair right hip  Surgical date: April 21, 2017  Surgeon: Dr. GOTTLIEB  Surgical site: [facility]    Overall patient feels well.  Reviewed medications.  There is no known drug allergies.  He has had anesthesia prior due to an orthopedic shoulder injury and had no problems with anesthesia.  No recent chest pain shortness of breath fever or illnesses.  He states that he is typically taking 2 pills of the 5 mg Percocets at night and wonders if there is a stronger dose.  He plans to be off work after the procedure and states that he will have orthopedics fill out his paperwork.  States he was told he will plan to be completely off of his leg for 3 weeks and be able to use crutches after that up until 8 weeks.  He will discuss physical therapy plans with surgeon.  Reviewed notes from orthopedics visit and the labs that were done on March 31 at [facility].      No Known Allergies.    Current Outpatient Prescriptions   Medication Sig Dispense Refill     fluticasone (FLONASE) 50 mcg/actuation nasal spray INSTILL TWO SPRAYS INTO EACH NOSTRILS DAILY. 48 g 3     INFLIXIMAB (REMICADE IV) SOLR. Use as directed.       oxyCODONE-acetaminophen  "(PERCOCET)  mg per tablet Take 1 tablet by mouth every 8 (eight) hours as needed for pain. 30 tablet 0     No current facility-administered medications for this visit.        Past Medical History, Family History, and Social History reviewed.  No past medical history on file.  Past Surgical History:   Procedure Laterality Date     [place] REPAIR UMBILICAL GHISLAINE,<6Y/O,REDUC      Description: Umbilical Hernia Repair;  Recorded: 11/05/2013;     Review of patient's allergies indicates no known allergies.  Family History   Problem Relation Age of Onset     No Medical Problems Mother      No Medical Problems Father      Social History     Social History     Marital status:      Spouse name: N/A     Number of children: N/A     Years of education: N/A     Occupational History     Not on file.     Social History Main Topics     Smoking status: Former Smoker     Quit date: 7/27/2003     Smokeless tobacco: Never Used     Alcohol use No     Drug use: No     Sexual activity: Not on file     Other Topics Concern     Not on file     Social History Narrative         Review of systems is as stated in HPI, and the remainder of the 10 system review is otherwise negative.    Objective:     Vitals:    04/13/17 1322   BP: 122/74   Pulse: 74   SpO2: 97%   Weight: 211 lb (95.7 kg)   Height: 6' 1\" (1.854 m)    Body mass index is 27.84 kg/(m^2).    General Appearance:    Alert, cooperative, no distress, appears stated age   Head:    Normocephalic, without obvious abnormality, atraumatic   Eyes:    PERRL   Ears:    Normal external ear canals   Nose:   Mucosa normal, no drainage        Throat:   Oropharynx is clear   Neck:   Supple, symmetrical, no adenopathy, no thyromegally       Lungs:     Clear to auscultation bilaterally, respirations unlabored   Chest Wall:    No tenderness or deformity    Heart:    Regular rate and rhythm, S1 and S2 normal, no murmur, rub    or gallop                   Extremities:  ambulates normally, no cyanosis " or edema   Pulses:   2+ and symmetric all extremities   Skin:   No rashes or lesions         This note has been dictated using voice recognition software. Any grammatical or context distortions are unintentional and inherent to the the software.

## 2021-06-12 NOTE — PROGRESS NOTES
"Vasectomy  Date/Time: 9/8/2017 12:49 PM  Performed by: KEN VEGA  Authorized by: KEN VEGA         Vasectomy Procedure Note    Indications: 42 y.o. male desiring permanent sterilization    Pre-operative Diagnosis: Undesired fertility    Post-operative Diagnosis: Undesired fertility    Anesthesia: 1% lidocaine, 3 ml    Procedure Details:    Edwin Becker  is seen today for scheduled vasectomy.  Patient desires permanent sterilization.  Consent form previously reviewed and signed by patient.  Patient elects to continue with scheduled procedure.     - Kami x 2009  1 daughter - Amelia (5)  1 son - Jabier (2)  1 stepdaughter - Maria Esther (16)  Tobacco: none (quit 2003)  EtOH: none  Mom -   Dad -   1 younger sis -   Surgeries: umbilical hernia; right rotator cuff; right hip arthroscopy and \"recontour the bone due to torn labrum\"...  Hospitalizations: none  Work: [employer] ([employer])  Hobbies: softball, hockey    Edwin Becker was prepped and draped in usual sterile fashion.  Right vas deferens isolated subcutaneously.  1% lidocaine injected superficially then to vas deferens.  15 blade incision performed.  Curved hemostat for blunt dissection.  Towel clip for vas deferens isolation.  Vas deferens sheath removed exposing normal-appearing vas deferens.  4-O chromic suture both proximal and distal segment of vas deferens with central portion excised for pathology.  Electrocautery of vas deferens ends performed.  Distal end then replacing into fascia with 5-0 chromic suture.  Good hemostasis noted.  Vas deferens then replacing into scrotum.  Single 4-0 chromic suture for skin incision closure.    Left vas deferens then isolated in a similar fashion.  1% lidocaine injected superficially and then to level of vas deferens and surrounding tissue.  15 blade incision performed.  Curved hemostat for blunt dissection.  Towel clip for vas deferens isolation.  Vas deferens sheath removed exposing " normal-appearing vas deferens.  4-0 chromic suture both proximal and distal segment of vas deferens with central portion excised for pathology.  Electrocautery of vas deferens ends performed.  Distal and replaced in the fascia with 5-0 chromic suture.  Good hemostasis noted.  Vas deferens then replaced into scrotum.  Single 4-0 chromic for skin incision closure.  Triple antibiotic with 4 x 4 gauze pads placed at bilateral incision sites.      Specimen: vas segment, bilateral    Condition: Stable    Complications: none    Plan:  1. Continue contraception until negative sperm analysis. Semen count after 20-25 ejaculations and 12 weeks s/p vasectomy.  2. Warning signs of infection were reviewed.   3. Written home care instructions provided.  Backup contraception until confirmed sterility.  May resume normal bathing after 24 hours.  Avoid strenuous activity times one week.  Ibuprofen or Tylenol OTC for pain management.  Notify with increased swelling, bleeding, or drainage.  Postvasectomy semen analysis in 12 weeks after 20-25 ejaculations to confirm desired sterility.  Call the clinic if excessive pain, bleeding or swelling.

## 2021-06-12 NOTE — PROGRESS NOTES
"Assessment:    1. Encounter for vasectomy counseling     2. Left hip pain     3. Ulcerative colitis without complications, unspecified location     4. Labral tear of hip, degenerative  oxyCODONE-acetaminophen (PERCOCET)  mg per tablet         Plan:    Contraception counseling reviewed.  Pre-vasectomy literature was provided.  Risks benefits alternatives were discussed.  Patient will schedule for vasectomy at his convenience likely September 8, 2017.  Call prior with questions.  Reviewed chronic left hip pain is scheduled for arthroscopy in August.  Refill on Percocet 10/325 use 1 tablet 3 times daily as needed #30 without refill.  Use sparingly for breakthrough pain.  Discussed ulcerative colitis followed by Dr. Sheets with [facility] and will continue Remicade every 8 weeks as noted.        Subjective:    Edwin Becker is seen today for contraception counseling.  Desires permanent sterilization.  Has 2 children plus a stepdaughter.  Denies bleeding concerns.  No family history of bleeding disorder.  Is scheduled to have left hip arthroscopy because a labral tear and recontouring of bone etc.  This is scheduled for August apparently.  Followed for ulcerative colitis by Dr. Sheets with [facility].  Continues Remicade.  He can feel achiness in joints closer to the end of the 8 week interval otherwise feels better with Remicade use and just wants to ensure no long-term complications.     - Kami x 2009  1 daughter - Amelia (5)  1 son - Jabier (2)  1 stepdaughter - Maria Esther (16)  Tobacco:  none (quit 2003)  EtOH:  none  Mom -   Dad -   1 younger sis -   Surgeries:  umbilical hernia; right rotator cuff; right hip arthroscopy and \"recontour the bone due to torn labrum\"...  Hospitalizations:  none  Work:  [employer] ([employer])  Hobbies:  softball, hockey    Past Surgical History:   Procedure Laterality Date     [place] REPAIR UMBILICAL GHISLAINE,<6Y/O,REDUC      Description: " Umbilical Hernia Repair;  Recorded: 11/05/2013;        Family History   Problem Relation Age of Onset     No Medical Problems Mother      No Medical Problems Father         History reviewed. No pertinent past medical history.     Social History   Substance Use Topics     Smoking status: Former Smoker     Quit date: 7/27/2003     Smokeless tobacco: Never Used     Alcohol use No        Current Outpatient Prescriptions   Medication Sig Dispense Refill     INFLIXIMAB (REMICADE IV) SOLR. Use as directed.       oxyCODONE-acetaminophen (PERCOCET)  mg per tablet Take 1 tablet by mouth every 8 (eight) hours as needed for pain. 30 tablet 0     fluticasone (FLONASE) 50 mcg/actuation nasal spray INSTILL TWO SPRAYS INTO EACH NOSTRILS DAILY. 48 g 3     No current facility-administered medications for this visit.           Objective:    Vitals:    07/26/17 1341   BP: 120/70   Pulse: 88   Weight: 196 lb (88.9 kg)      Body mass index is 25.86 kg/(m^2).    Alert.  No apparent distress.  Genitalia circumcised male.  Testes descended bilaterally.  No hydrocele or varicocele.  Palpable vas deferens.  No inguinal hernia.  Chest clear.  Cardiac exam regular.  Abdomen benign.

## 2021-06-13 NOTE — PROGRESS NOTES
"Assessment:    1. Acute wheezy bronchitis  azithromycin (ZITHROMAX) 250 MG tablet    albuterol (PROAIR HFA;PROVENTIL HFA;VENTOLIN HFA) 90 mcg/actuation inhaler   2. Ulcerative colitis without complications, unspecified location     3. Environmental allergies           Plan:    Discussed findings of acute bronchitis with wheeze.  Z-James prescribed with albuterol metered-dose inhaler as needed for wheeze.  Immunosuppression with ulcerative colitis status post Remicade infusion 2 weeks ago.  Will delay influenza vaccination until next week then should receive which patient is in agreement with.  Has had Pneumovax immunization previously and immunizations reviewed and otherwise up-to-date.  Environmental allergies discussed and would recommend cetirizine 10 mg daily on a more regular basis instead of intermittent use only in order to prevent significant mucosal irritation and drainage.        Subjective:    Edwin Becker is seen today for evaluation of ongoing illness.  Onset last weekend.  Chest congestion primarily with some nasal drainage and sneezing.  Allergies typically in the fall does use Zyrtec however only on occasional basis.  Did have Remicade infusion about 2 weeks ago for ulcerative colitis management which he describes as being in remission.  Has had pneumonia several times in the past and was told to get treated early for respiratory infection to prevent recurrent pneumonia.  No nausea or vomiting.  No diarrhea.  Comprehensive review of systems as above otherwise all negative.     - Kami x 2009  1 daughter - Amelia (5)  1 son - Jabier (2)  1 stepdaughter - Maria Esther (16)  Tobacco: none (quit 2003)  EtOH: none  Mom -   Dad -   1 younger sis -   Surgeries: umbilical hernia; right rotator cuff; right hip arthroscopy and \"recontour the bone due to torn labrum\"...  Hospitalizations: none  Work: [employer] ([employer])  Hobbies: softball, hockey    Past Surgical History:   Procedure Laterality Date "     [place] REPAIR UMBILICAL GHISLAINE,<4Y/O,REDUC      Description: Umbilical Hernia Repair;  Recorded: 11/05/2013;        Family History   Problem Relation Age of Onset     No Medical Problems Mother      No Medical Problems Father         History reviewed. No pertinent past medical history.     Social History   Substance Use Topics     Smoking status: Former Smoker     Quit date: 7/27/2003     Smokeless tobacco: Never Used     Alcohol use No        Current Outpatient Prescriptions   Medication Sig Dispense Refill     INFLIXIMAB (REMICADE IV) SOLR. Use as directed.       iron 18 mg Tab Take by mouth.       albuterol (PROAIR HFA;PROVENTIL HFA;VENTOLIN HFA) 90 mcg/actuation inhaler Inhale 2 puffs every 6 (six) hours as needed for wheezing. 1 each 0     azithromycin (ZITHROMAX) 250 MG tablet Take 2 tabs on day one, and then 1 tab on days 2-5. 6 tablet 0     No current facility-administered medications for this visit.           Objective:    Vitals:    10/12/17 0948   BP: 120/80   Pulse: 78   Temp: 98.1  F (36.7  C)   SpO2: 97%   Weight: 204 lb (92.5 kg)      Body mass index is 26.91 kg/(m^2).    Alert.  Mildly ill.  Nontoxic.  Harsh cough noted with productive character.  HEENT exam with TMs normal.  Nasopharynx with mild nasal congestion otherwise oropharynx with moist mucous membranes without obvious postnasal drainage.  Chest with scattered expiratory wheeze without inspiratory crackles.  Diffuse rhonchi.  Extremities warm and dry.  Good skin turgor.

## 2021-06-16 NOTE — PROGRESS NOTES
"Assessment/Plan:    1. CAP (community acquired pneumonia)  Discussed concerns consistent with community-acquired pneumonia versus walking pneumonia.  Z-James was prescribed.  Albuterol metered-dose inhaler as needed for cough or wheeze.  Follow-up with worsening or nonimprovement.  Hemogram normal.  Note provided stating out of work March 6 - March 9, 2018.  - XR Chest 2 Views; Future  - HM2(CBC w/o Differential)    Lab Results   Component Value Date    WBC 9.1 03/08/2018    HGB 15.8 03/08/2018    HCT 46.1 03/08/2018    MCV 87 03/08/2018     03/08/2018        2. Ulcerative colitis without complications, unspecified location  History of ulcerative colitis.  Receives Remicade every 8 weeks IV.  Tolerating well without recent colitis flare.    3. History of vasectomy  Reviewed prior vasectomy September 8, 2017 and postvasectomy semen analysis still needs to be completed in order to confirm desired sterility.  Specimen container was provided.  Orders have already been entered into chart.          Subjective:    Edwin Becker is seen today for ongoing illness.  Symptoms over past week and a half.  Cough.  Feels wiped out.  Low-grade temps perhaps.  No abdominal pain.  No diarrhea.  No nausea vomiting.  Uncertain if his receive flu shot this season.  Nasal congestion.  No sore throat.  Left-sided nasal congestion primarily.  Ulcerative colitis history.  Receives Remicade on a consistent basis every 8 weeks.  Prior vasectomy.  Is not confirmed postvasectomy sterility.     - Kami x 2009  1 daughter - Amelia (5)  1 son - Jabier (2)  1 stepdaughter - Maria Esther (16)  Tobacco: none (quit 2003)  EtOH: none  Mom -   Dad -   1 younger sis -   Surgeries: umbilical hernia; right rotator cuff; right hip arthroscopy and \"recontour the bone due to torn labrum\"...  Hospitalizations: none  Work: [employer]  Hobbies: softball, hockey    Past Surgical History:   Procedure Laterality Date     [place] REPAIR UMBILICAL " GHISLAINE,<6Y/O,REDUC      Description: Umbilical Hernia Repair;  Recorded: 11/05/2013;        Family History   Problem Relation Age of Onset     No Medical Problems Mother      No Medical Problems Father         History reviewed. No pertinent past medical history.     Social History   Substance Use Topics     Smoking status: Former Smoker     Quit date: 7/27/2003     Smokeless tobacco: Never Used     Alcohol use No        Current Outpatient Prescriptions   Medication Sig Dispense Refill     INFLIXIMAB (REMICADE IV) SOLR. Use as directed.       albuterol (PROAIR HFA;PROVENTIL HFA;VENTOLIN HFA) 90 mcg/actuation inhaler Inhale 2 puffs every 6 (six) hours as needed for wheezing. 1 each 0     azithromycin (ZITHROMAX) 250 MG tablet Take 2 tabs on day one, and then 1 tab on days 2-5. 6 tablet 0     iron 18 mg Tab Take by mouth.       No current facility-administered medications for this visit.           Objective:    Vitals:    03/08/18 1313   BP: 100/60   Pulse: 88   Temp: 98.4  F (36.9  C)   Weight: 191 lb (86.6 kg)      Body mass index is 25.2 kg/(m^2).    Alert.  No apparent distress.  Chest clear.  HEENT exam with left nasal septal deviation and significant nasal congestion.  Oropharynx with moist mucous membranes.  Mild conjunctival injection.  Neck supple without significant cervical lymphadenopathy.  Chest with left greater than right inspiratory squeak and crackle with noted rhonchi without expiratory wheeze currently.  Cardiac exam regular.  Abdomen benign with positive bowel sounds.  Extremities warm and dry.

## 2021-06-17 NOTE — PROGRESS NOTES
Patient ID: Edwin Becker is a 45 y.o. male.  /78   Pulse 85   Temp 98.7  F (37.1  C)   Wt 220 lb (99.8 kg)   SpO2 98%   BMI 29.03 kg/m      Assessment/Plan:                   Diagnoses and all orders for this visit:    Gastroesophageal reflux disease, unspecified whether esophagitis present  -     omeprazole (PRILOSEC) 20 MG capsule; Take 1 capsule (20 mg total) by mouth 2 (two) times a day before meals.  Dispense: 60 capsule; Refill: 3  -     Ambulatory referral for Upper GI Endoscopy    Cough  -     XR Chest 2 Views  -     omeprazole (PRILOSEC) 20 MG capsule; Take 1 capsule (20 mg total) by mouth 2 (two) times a day before meals.  Dispense: 60 capsule; Refill: 3  -     Ambulatory referral for Upper GI Endoscopy    Dysphagia, unspecified type  -     omeprazole (PRILOSEC) 20 MG capsule; Take 1 capsule (20 mg total) by mouth 2 (two) times a day before meals.  Dispense: 60 capsule; Refill: 3  -     Ambulatory referral for Upper GI Endoscopy          DISCUSSION  Chest x-ray does not show any lung findings.  The constellation of symptoms seems to be most consistent with acid reflux likely triggering cough, discomfort and probably of the intermittent dysphagia.  Based on the severity of symptoms described I do feel he should undergo endoscopy.  Recommend ceasing further use of NSAIDs.  Prescribed omeprazole 20 mg twice daily.  Monitor symptoms closely and make further plans pending short-term clinical course and endoscopy.  No signs or symptoms of gastrointestinal blood loss but may need to consider further lab evaluation if concerns arise.  Subjective:     HPI    Edwin Becker is a 45 y.o. male this medical history includes ulcerative colitis on immunologic therapy.  He is here today concerned regarding a cough, he also notes that he has intermittent dysphagia for certain foods especially meat and other dry foods.  He also gives a discomfort sensation in his midsternum often associated with eating.  Cough  has been more noticeable since battling Covid back in October.  Cough is dry.  Cough is variable.  No significant breathing difficulty otherwise.  No fevers chills or unexpected weight loss.  Some decreased smell lingering since his Covid diagnosis in October as well but no other concerning symptoms.  Does take ibuprofen 600 mg usually daily for management of musculoskeletal pain.  Occasionally has tried some Prilosec which helps to relieve symptoms temporarily but has not taken medication consistently.    Review of Systems  Complete review of systems is obtained.  Other than the specific considerations noted above complete review of systems is negative.          Objective:   Medications:  Current Outpatient Medications   Medication Sig     acetaminophen (TYLENOL) 500 MG tablet Take 2 tablets (1,000 mg total) by mouth 3 (three) times a day.     ibuprofen (ADVIL) 200 MG tablet Take 3 tablets (600 mg total) by mouth every 6 (six) hours as needed for pain.     infliximab-dyyb (INFLECTRA IV) Infuse into a venous catheter.     inFLIXimab (REMICADE) 100 mg injection Infuse 5 mg/kg into a venous catheter.     omeprazole (PRILOSEC) 20 MG capsule Take 1 capsule (20 mg total) by mouth 2 (two) times a day before meals.       Allergies:  No Known Allergies    Tobacco:   reports that he quit smoking about 17 years ago. He has never used smokeless tobacco.     Physical Exam          /78   Pulse 85   Temp 98.7  F (37.1  C)   Wt 220 lb (99.8 kg)   SpO2 98%   BMI 29.03 kg/m      General: Patient no signs of distress    HEENT: No lesions in the mouth or pharynx, neck is supple without lymphadenopathy or masses.    Lungs: Good air movement no wheeze or crackle      Heart: Regular rate and rhythm no murmur    Abdomen soft nondistended nontender, mild epigastric tenderness is noted but no rebound tenderness or guarding.

## 2021-06-19 NOTE — LETTER
"Letter by Richard Zaragoza MD at      Author: Richard Zaragoza MD Service: -- Author Type: --    Filed:  Encounter Date: 6/12/2019 Status: (Other)         Mario Nicholas MD  [address] [place] [address]                                  June 14, 2019    Patient: Edwin Becker   MR Number: [id number]   YOB: 1975   Date of Visit: 6/12/2019     Dear Dr. Cristopher MD:    I have seen Edwin Becker in follow up. Below are the relevant portions of my assessment and plan of care.    If you have questions, please do not hesitate to call me. I look forward to following Edwin along with you.    Sincerely,        Richard Zaragoza MD          CC  No Recipients  Richard Zaragoza MD  6/14/2019  3:48 PM  Sign at close encounter  [facility] INFECTIOUS DISEASE CLINIC FOLLOW UP NOTE    Date: 6/14/2019  Patient Name: Edwin Becker   YOB: 1975  MRN: [id number]      ASSESSMENT:  MSSA left prepatellar bursitis. S/p I+D 5/17. Returned with worsening swelling and had another I+D 5/29 -- operative findings with hematoma and necrotic tissue. Debridement was fairly extensive including to \"subtenons tissue, fascia and overlying quadriceps muscle and VMO.\" Culture negative at that time. Now on cephalexin 1000mg three times a day and there is no outward sign of infection. Labs look good. Infection may be gone. Wound not closed fully to allow drainage. Discussed with Dr. Rodriguez after visit and will plan another week of oral antibiotic due to his complicated course.         PLAN:  Labs today communicated to him after the visit.   Plan another week of cephalexin to be completed around 6/22/19.   Return in 2 weeks.     Richard Zaragoza MD  [facility]   Clinic phone: [phone]   Clinic fax: [phone]    ______________________________________________________________________    SUBJECTIVE / INTERVAL HISTORY: Edwin Becker returns for follow up of MSSA prepatellar bursitis left " knee.     He had an superficial injury to the left distal lateral thigh from being hit by a coil spring about 1-2 weeks prior to noting swelling and pain anterior to the right knee. He had bursa aspirated 5/10, culture was negative. He was started on antibiotic, but due to poor response he underwent I+D of bursa with bursectomy on 5/17/19, culture grew MSSA. Recalls that he was on antibiotic for about a week after, probably linezolid from previous hospital stay. Noted increase in swelling, was seen again and admitted for another I+D 5/29. Operative findings showed hematoma, no pus, but there was necrotic tissue. Treated with IV cefazolin in the hospital, and discharged on cephalexin 1000mg three times a day.      Has UC and is treated with Remicade q6 weeks, is due for next dose. UC is controlled on this.  He feels joint pains coming on which is typical when this disease is flaring for him.     Knee pain but improved, looks much better. Tolerating cephalexin. Saw Dr. Nicholas yesterday. Feels that knee is looking better compared to last debridement at this time. ROM is still poor. Taking ibuprofen, tylenol during day, takes oxycodone at night to help sleep.     Past Medical History:   Diagnosis Date   ? Bursitis     left knee   ? Ulcerative colitis (H)     on remicade infusion every 6 nweeks      Past Surgical History:   Procedure Laterality Date   ? Bursectomy Left 05/17/2019    Knee   ? HIP SURGERY Bilateral     Labral Tear Repair   ? [place] REPAIR UMBILICAL GHISLAINE,<6Y/O,REDUC      Description: Umbilical Hernia Repair;  Recorded: 11/05/2013;   ? SHOULDER SURGERY Right     RCR         ROS: All other systems negative except as listed above.      Current Outpatient Medications:   ?  acetaminophen (TYLENOL) 500 MG tablet, Take 2 tablets (1,000 mg total) by mouth 3 (three) times a day., Disp: , Rfl: 0  ?  [START ON 6/15/2019] cephalexin (KEFLEX) 500 MG capsule, Take 2 capsules (1,000 mg total) by mouth 3 (three) times a day for  7 days. Start once other prescription runs out, Disp: 42 capsule, Rfl: 0  ?  cetirizine (ZYRTEC) 10 MG tablet, Take 10 mg by mouth daily as needed for allergies., Disp: , Rfl:   ?  ibuprofen (ADVIL) 200 MG tablet, Take 3 tablets (600 mg total) by mouth every 6 (six) hours as needed for pain., Disp: , Rfl: 0  ?  oxyCODONE (ROXICODONE) 5 MG immediate release tablet, Take 1 tablet (5 mg total) by mouth every 8 (eight) hours as needed., Disp: 15 tablet, Rfl: 0  ?  hydrOXYzine pamoate (VISTARIL) 25 MG capsule, Take 1 capsule (25 mg total) by mouth every 6 (six) hours as needed for itching (pain)., Disp: 30 capsule, Rfl: 0  ?  senna-docusate (PERICOLACE) 8.6-50 mg tablet, Take 1 tablet by mouth 2 (two) times a day., Disp: , Rfl: 0      OBJECTIVE:  Vitals:    06/12/19 1001   BP: 112/72   Temp: 97.9  F (36.6  C)         GEN: No acute distress.    RESPIRATORY:  Normal breathing pattern.   CARDIOVASCULAR:  Regular rate and rhythm. Normal S1 and S2.   ABDOMEN:  Soft, normal bowel sounds, non-tender  EXTREMITIES: L knee with incision remaining open as planned. There is what looks like clotted blood toward distal aspect. Mildly tender. No erythema. ROM is limited.   SKIN/HAIR/NAILS:  No rashes          Pertinent labs:    Results from last 7 days   Lab Units 06/12/19  1038   LN-WHITE BLOOD CELL COUNT thou/uL 6.4   LN-HEMOGLOBIN g/dL 15.2   LN-HEMATOCRIT % 44.8   LN-PLATELET COUNT thou/uL 298     Results from last 7 days   Lab Units 06/12/19  1038   LN-SODIUM mmol/L 138   LN-POTASSIUM mmol/L 5.0   LN-CHLORIDE mmol/L 102   LN-CO2 mmol/L 30   LN-BLOOD UREA NITROGEN mg/dL 14   LN-CREATININE mg/dL 0.96   LN-CALCIUM mg/dL 10.4     Lab Results   Component Value Date    CRP 0.2 06/12/2019         Lab Results   Component Value Date    ALT 22 05/10/2019    AST 17 05/10/2019    ALKPHOS 76 05/10/2019    BILITOT 0.3 05/10/2019         MICROBIOLOGY DATA:  MSSA

## 2021-06-20 NOTE — PROGRESS NOTES
Assessment:     1. Healthcare maintenance  Influenza, Seasonal,Quad Inj, 36+ MOS    Antinuclear Antibody (CODY) Cascade    C-Reactive Protein    Lyme Antibody Cascade    Rheumatoid Factor Quant    Erythrocyte Sedimentation Rate   2. Ankle swelling  Antinuclear Antibody (CODY) Cascade    C-Reactive Protein    Lyme Antibody Cascade    Rheumatoid Factor Quant    Erythrocyte Sedimentation Rate       Plan:     1. Healthcare maintenance  Patient is on Remicade and has upcoming appointment with gastroenterology he is been on this for 10 years patient has noticed tenderness in his ankles and some ankle swelling concern is for arthritis associated with EMILEE; also other rhytides need to be ruled out consultation with rheumatology may be considered  - Influenza, Seasonal,Quad Inj, 36+ MOS  - Antinuclear Antibody (CODY) Cascade  - C-Reactive Protein  - Lyme Antibody Cascade  - Rheumatoid Factor Quant  - Erythrocyte Sedimentation Rate    2. Ankle swelling  3-4 weeks of ankle swelling left greater than right mild to moderate with some tenderness patient is a  on his feet all day long he does add salt to his foods he is not on any antihypertensive medication not aware of any tick bites patient does have EMILEE as noted above note workup as per below we will contact patient with results if consultation with rheumatology indicated will call patient will also consult with gastroenterology with regards to chronic use of Remicade  - Antinuclear Antibody (CODY) Cascade  - C-Reactive Protein  - Lyme Antibody Cascade  - Rheumatoid Factor Quant  - Erythrocyte Sedimentation Rate      Subjective:   I am seeing this patient complaining of 3 or 4 weeks of some ankle tenderness more on the left than on the right without any history of trauma although the patient is active in sports and also works on his feet all day long as a  from [employer].  Patient has chronic ulcerative colitis and has been on Remicade for years  "he gives a slight history of some knee discomfort and shoulder discomfort right side although he has had labral injuries to both hips and to his right shoulder from athletic trauma.  Patient constitutionally feels well and system review is essentially unremarkable there is no evidence of any chest discomfort hemoptysis.  Workup will be for connective tissue disorder vasculitis and arthritis ulcerative colitis association.  Referral to rheumatology may be considered depending on preliminary test results will also consult with gastroenterology with regards to use of Remicade and effect on joints.    Review of Systems: A complete 14 point review of systems was obtained and is negative or as stated in the history of present illness.    No past medical history on file.  Family History   Problem Relation Age of Onset     No Medical Problems Mother      No Medical Problems Father      Past Surgical History:   Procedure Laterality Date     [place] REPAIR UMBILICAL GHISLAINE,<4Y/O,REDUC      Description: Umbilical Hernia Repair;  Recorded: 11/05/2013;     Social History   Substance Use Topics     Smoking status: Former Smoker     Quit date: 7/27/2003     Smokeless tobacco: Never Used     Alcohol use No         Objective:   /66  Pulse 72  Ht 6' 1.5\" (1.867 m)  Wt 198 lb 9.6 oz (90.1 kg)  BMI 25.85 kg/m2    General Appearance:  Alert, cooperative, no distress  Head:  Normocephalic, no obvious abnormality  Ears: TM anatomy normal  Eyes:  PERRL, EOM's intact, conjunctiva and corneas clear  Nose:  Nares symmetrical, septum midline, mucosa pink, no sinus tenderness  Throat:  Lips, tongue, and mucosa are moist, pink, and intact  Neck:  Supple, symmetrical, trachea midline, no adenopathy; thyroid: no enlargement, symmetric,no tenderness/mass/nodules; no carotid bruit, no JVD  Back:  Symmetrical, no curvature, ROM normal, no CVA tenderness  Chest/Breast:  No mass or tenderness  Lungs:  Clear to auscultation bilaterally, respirations " unlabored   Heart:  Normal PMI, regular rate & rhythm, S1 and S2 normal, no murmurs, rubs, or gallops  Abdomen:  Soft, non-tender, bowel sounds active all four quadrants, no mass, or organomegaly  Musculoskeletal:  Tone and strength strong and symmetrical, all extremities  Lymphatic:  No adenopathy  Skin/Hair/Nails:  Skin warm, dry, and intact, no rashes  Neurologic:  Alert and oriented x3, no cranial nerve deficits, normal strength and tone, gait steady  Extremities: 0-1+ ankle swelling distally left ankle trace ankle swelling right side tenderness on flexion extension left greater than right Homans sign negative no erythema  Genitourinary: deferred  Pulses:  Equal bilaterally           This note has been dictated using voice recognition software. Any grammatical or context distortions are unintentional and inherent to the the software.

## 2021-07-03 NOTE — ADDENDUM NOTE
Addendum Note by Stephanie Hannon RN at 6/12/2019  9:45 AM     Author: Stephanie Hannon RN Service: -- Author Type: Registered Nurse    Filed: 6/17/2019 11:57 AM Encounter Date: 6/12/2019 Status: Signed    : Stephanie Hannon RN (Registered Nurse)    Addended by: STEPHANIE HANNON on: 6/17/2019 11:57 AM        Modules accepted: Orders

## 2021-07-11 ENCOUNTER — HEALTH MAINTENANCE LETTER (OUTPATIENT)
Age: 46
End: 2021-07-11

## 2021-07-15 RX ORDER — INFLIXIMAB 100 MG/10ML
5 INJECTION, POWDER, LYOPHILIZED, FOR SOLUTION INTRAVENOUS
COMMUNITY

## 2021-07-15 RX ORDER — CITALOPRAM HYDROBROMIDE 20 MG/1
TABLET ORAL
COMMUNITY
Start: 2021-07-02 | End: 2022-09-27

## 2021-07-15 RX ORDER — CETIRIZINE HYDROCHLORIDE 10 MG/1
CAPSULE, LIQUID FILLED ORAL
COMMUNITY
Start: 2021-05-27

## 2021-07-15 NOTE — PROGRESS NOTES
Edwin is a 46 year old who is being evaluated via a billable telephone visit.      What phone number would you like to be contacted at? [phone]  How would you like to obtain your AVS? [facility]    Assessment & Plan     1. Moderate major depression (H)                  No follow-ups on file.    Richard Zambrano MD, MD  [facility]    Aimee Pineda is a 46 year old is evaluated today via this phone visit for depression.  July 2 he had an office visit where we diagnosed major depression moderate by PHQ-9 and clinical evaluation.  He was started on citalopram 10 mg daily.  Arrangements were made for cognitive behavioral therapy.  He was educated on aspects of self-care.    He reports that the medication made him feel somewhat strange, he has a hard time being able to quantify this.  After 40 is taking medication elected to stop.  Today we discussed options for medications, we discussed that this could be a side effect it would gradually diminish in time.  We also discussed that we could change medication or consider even going without medication treatment.  He is willing to retry medication and see if the side effect would diminish.  He has been contacted to schedule therapy but has not actually scheduled an appointment yet he is encouraged to do so.  Does state that he overall feels better with just initiating a process to address his concerns.  Denies any new or worsening symptoms.  PHQ-9 score today is 4.    PHQ 7/16/2021   PHQ-9 Total Score 4   Q9: Thoughts of better off dead/self-harm past 2 weeks Not at all             Review of Systems   Complete review of systems is obtained.  Other than the specific considerations noted above complete review of systems is negative.        Objective           Vitals:  No vitals were obtained today due to virtual visit.    Physical Exam   N/A              Phone call duration:  7 minutes

## 2021-07-16 ENCOUNTER — TRANSFERRED RECORDS (OUTPATIENT)
Dept: HEALTH INFORMATION MANAGEMENT | Facility: CLINIC | Age: 46
End: 2021-07-16

## 2021-07-16 ENCOUNTER — VIRTUAL VISIT (OUTPATIENT)
Dept: FAMILY MEDICINE | Facility: CLINIC | Age: 46
End: 2021-07-16
Payer: COMMERCIAL

## 2021-07-16 ENCOUNTER — TELEPHONE (OUTPATIENT)
Dept: FAMILY MEDICINE | Facility: CLINIC | Age: 46
End: 2021-07-16

## 2021-07-16 DIAGNOSIS — F32.1 MODERATE MAJOR DEPRESSION (H): Primary | ICD-10-CM

## 2021-07-16 PROBLEM — K22.2 OBSTRUCTION OF ESOPHAGUS: Status: ACTIVE | Noted: 2021-05-27

## 2021-07-16 PROBLEM — M24.159 LABRAL TEAR OF HIP, DEGENERATIVE: Status: ACTIVE | Noted: 2017-04-13

## 2021-07-16 PROBLEM — L03.116 CELLULITIS OF LEFT KNEE: Status: ACTIVE | Noted: 2021-07-16

## 2021-07-16 PROBLEM — E78.5 DYSLIPIDEMIA: Status: ACTIVE | Noted: 2021-07-16

## 2021-07-16 PROBLEM — R19.7 DIARRHEA: Status: ACTIVE | Noted: 2020-03-09

## 2021-07-16 PROBLEM — D64.9 ANEMIA: Status: ACTIVE | Noted: 2021-07-16

## 2021-07-16 PROBLEM — M71.10 SEPTIC BURSITIS: Status: ACTIVE | Noted: 2019-05-09

## 2021-07-16 PROBLEM — M54.2 CERVICALGIA: Status: ACTIVE | Noted: 2021-07-16

## 2021-07-16 PROBLEM — K31.9 DISORDER OF STOMACH: Status: ACTIVE | Noted: 2021-06-01

## 2021-07-16 PROBLEM — M71.9 DISORDER OF BURSAE AND TENDONS IN SHOULDER REGION: Status: ACTIVE | Noted: 2021-07-16

## 2021-07-16 PROBLEM — K51.90 ULCERATIVE COLITIS (H): Status: ACTIVE | Noted: 2021-07-16

## 2021-07-16 PROBLEM — M25.562 KNEE PAIN, LEFT: Status: ACTIVE | Noted: 2019-05-09

## 2021-07-16 PROBLEM — M70.42 PREPATELLAR BURSITIS, LEFT KNEE: Status: ACTIVE | Noted: 2021-07-16

## 2021-07-16 PROBLEM — M67.919 DISORDER OF BURSAE AND TENDONS IN SHOULDER REGION: Status: ACTIVE | Noted: 2021-07-16

## 2021-07-16 PROCEDURE — 96127 BRIEF EMOTIONAL/BEHAV ASSMT: CPT | Performed by: FAMILY MEDICINE

## 2021-07-16 PROCEDURE — 99213 OFFICE O/P EST LOW 20 MIN: CPT | Performed by: FAMILY MEDICINE

## 2021-07-16 ASSESSMENT — PATIENT HEALTH QUESTIONNAIRE - PHQ9: SUM OF ALL RESPONSES TO PHQ QUESTIONS 1-9: 4

## 2021-07-16 NOTE — TELEPHONE ENCOUNTER
Can we please schedule a follow-up telephone visit for Edwin in about 2 weeks with Dr Zambrano. 20 minutes would be just fine. I am thinking around July 30th.  Thank you.

## 2021-07-26 ENCOUNTER — TRANSFERRED RECORDS (OUTPATIENT)
Dept: HEALTH INFORMATION MANAGEMENT | Facility: CLINIC | Age: 46
End: 2021-07-26

## 2021-08-06 ENCOUNTER — TRANSFERRED RECORDS (OUTPATIENT)
Dept: HEALTH INFORMATION MANAGEMENT | Facility: CLINIC | Age: 46
End: 2021-08-06

## 2021-08-20 ENCOUNTER — TRANSFERRED RECORDS (OUTPATIENT)
Dept: HEALTH INFORMATION MANAGEMENT | Facility: CLINIC | Age: 46
End: 2021-08-20

## 2021-08-26 ENCOUNTER — TRANSFERRED RECORDS (OUTPATIENT)
Dept: HEALTH INFORMATION MANAGEMENT | Facility: CLINIC | Age: 46
End: 2021-08-26

## 2021-09-05 ENCOUNTER — HEALTH MAINTENANCE LETTER (OUTPATIENT)
Age: 46
End: 2021-09-05

## 2021-10-26 ENCOUNTER — VIRTUAL VISIT (OUTPATIENT)
Dept: FAMILY MEDICINE | Facility: CLINIC | Age: 46
End: 2021-10-26
Payer: COMMERCIAL

## 2021-10-26 DIAGNOSIS — J20.9 ACUTE BRONCHITIS, UNSPECIFIED ORGANISM: Primary | ICD-10-CM

## 2021-10-26 DIAGNOSIS — K51.90 ULCERATIVE COLITIS WITHOUT COMPLICATIONS, UNSPECIFIED LOCATION (H): ICD-10-CM

## 2021-10-26 PROCEDURE — 99214 OFFICE O/P EST MOD 30 MIN: CPT | Mod: GT | Performed by: NURSE PRACTITIONER

## 2021-10-26 RX ORDER — AZITHROMYCIN 250 MG/1
TABLET, FILM COATED ORAL
Qty: 6 TABLET | Refills: 0 | Status: SHIPPED | OUTPATIENT
Start: 2021-10-26 | End: 2021-10-31

## 2021-10-26 NOTE — PROGRESS NOTES
Valentin is a 46 year old who is being evaluated via a billable video visit.      How would you like to obtain your AVS? MyChart  If the video visit is dropped, the invitation should be resent by: Text to cell phone: [phone]  Will anyone else be joining your video visit? No      Video Start Time: 3:26 PM      ICD-10-CM    1. Acute bronchitis, unspecified organism  J20.9 azithromycin (ZITHROMAX) 250 MG tablet   2. Ulcerative colitis without complications, unspecified location (H)  K51.90      Differentials include COVID-19, other viral illnesses, bronchitis, pneumonia.  Offered Covid testing, but he declines.  Patient is immune compromised as he is receiving Remicade injections.  Discussed treatment options.  Will treat with Z-James, take as directed.  Educated on its indications and side effects.  Discussed symptomatic treatment.  If fever develops or cough worsens, suggest follow-up with PCP.  He is content with the plan.      Subjective   Valentin is a 46 year old who presents for the following health issues   Patient complains of cold symptoms for 8 days.  He complains of right ear pain and a productive cough.  He has had postnasal drainage.  He denies shortness of breath, chest tightness, wheezing, fever, headache, stomachache, nausea, vomiting, diarrhea.  He has been taking over-the-counter NyQuil.  No one else around him is ill.  He denies any known Covid exposure.  He was diagnosed with Covid 1 year ago and has been vaccinated.  Patient does receive Remicade injections for ulcerative colitis.      Review of Systems   Constitutional, HEENT, cardiovascular, pulmonary, gi and gu systems are negative, except as otherwise noted.      Objective           Vitals:  No vitals were obtained today due to virtual visit.    Physical Exam   GENERAL: Healthy, alert and no distress  EYES: Eyes grossly normal to inspection.  No discharge or erythema, or obvious scleral/conjunctival abnormalities.  RESP: No audible wheeze, cough,  or visible cyanosis.  No visible retractions or increased work of breathing.    SKIN: Visible skin clear. No significant rash, abnormal pigmentation or lesions.  NEURO: Cranial nerves grossly intact.  Mentation and speech appropriate for age.  PSYCH: Mentation appears normal, affect normal/bright, judgement and insight intact, normal speech and appearance well-groomed.          Video-Visit Details    Type of service:  Video Visit    Video End Time:3:33 PM    Originating Location (pt. Location): Home    Distant Location (provider location):  [facility]     Platform used for Video Visit: Mykel

## 2021-11-05 ENCOUNTER — TRANSFERRED RECORDS (OUTPATIENT)
Dept: HEALTH INFORMATION MANAGEMENT | Facility: CLINIC | Age: 46
End: 2021-11-05
Payer: COMMERCIAL

## 2021-11-05 LAB
ALT SERPL-CCNC: 25 IU/L (ref 0–44)
AST SERPL-CCNC: 24 IU/L (ref 0–40)

## 2021-11-26 ENCOUNTER — OFFICE VISIT (OUTPATIENT)
Dept: FAMILY MEDICINE | Facility: CLINIC | Age: 46
End: 2021-11-26
Payer: COMMERCIAL

## 2021-11-26 VITALS
HEART RATE: 64 BPM | SYSTOLIC BLOOD PRESSURE: 124 MMHG | WEIGHT: 218 LBS | BODY MASS INDEX: 28.89 KG/M2 | DIASTOLIC BLOOD PRESSURE: 76 MMHG | HEIGHT: 73 IN | OXYGEN SATURATION: 99 %

## 2021-11-26 DIAGNOSIS — Z00.01 ENCOUNTER FOR ROUTINE ADULT MEDICAL EXAM WITH ABNORMAL FINDINGS: ICD-10-CM

## 2021-11-26 DIAGNOSIS — Z23 HIGH PRIORITY FOR 2019-NCOV VACCINE: Primary | ICD-10-CM

## 2021-11-26 PROCEDURE — 82947 ASSAY GLUCOSE BLOOD QUANT: CPT | Performed by: FAMILY MEDICINE

## 2021-11-26 PROCEDURE — 80061 LIPID PANEL: CPT | Performed by: FAMILY MEDICINE

## 2021-11-26 PROCEDURE — 90686 IIV4 VACC NO PRSV 0.5 ML IM: CPT | Performed by: FAMILY MEDICINE

## 2021-11-26 PROCEDURE — 36415 COLL VENOUS BLD VENIPUNCTURE: CPT | Performed by: FAMILY MEDICINE

## 2021-11-26 PROCEDURE — 99396 PREV VISIT EST AGE 40-64: CPT | Mod: 25 | Performed by: FAMILY MEDICINE

## 2021-11-26 PROCEDURE — 90471 IMMUNIZATION ADMIN: CPT | Performed by: FAMILY MEDICINE

## 2021-11-26 ASSESSMENT — MIFFLIN-ST. JEOR: SCORE: 1932.72

## 2021-11-26 ASSESSMENT — PATIENT HEALTH QUESTIONNAIRE - PHQ9
SUM OF ALL RESPONSES TO PHQ QUESTIONS 1-9: 0
SUM OF ALL RESPONSES TO PHQ QUESTIONS 1-9: 0
10. IF YOU CHECKED OFF ANY PROBLEMS, HOW DIFFICULT HAVE THESE PROBLEMS MADE IT FOR YOU TO DO YOUR WORK, TAKE CARE OF THINGS AT HOME, OR GET ALONG WITH OTHER PEOPLE: NOT DIFFICULT AT ALL

## 2021-11-26 NOTE — PROGRESS NOTES
"Edwin Becker  /[birth year]   Pulse 64   Ht 1.854 m (6' 1\")   Wt 98.9 kg (218 lb)   SpO2 99%   BMI 28.76 kg/m       Assessment/Plan:                Edwin was seen today for annual visit, immunization, recheck and imm/inj.    Diagnoses and all orders for this visit:    High priority for 2019-nCoV vaccine  -     COVID-19,PF,PFIZER (12+ Yrs PURPLE LABEL)    Encounter for routine adult medical exam with abnormal findings  -     Lipid panel reflex to direct LDL Fasting; Future  -     Glucose; Future    Other orders  -     INFLUENZA VACCINE IM >6 MO VALENT IIV4 (ALFURIA/FLUZONE)         DISCUSSION  Covid vaccine and influenza vaccines administered today.  Labs obtained as above.  See additional discussion below.  Subjective:     HPI:    Edwin Becker is a 46 year old male who is here today for physical.  His medical history includes ulcerative colitis.  He is maintained on Remicade.  He follows regularly with gastroenterology.  Had a colonoscopy in 2019 he is coming due to have colonoscopy again.  Did have endoscopy due to some acid reflux symptoms currently not a concern, this was performed within the past year.  Also dealt with some depression symptoms which he feels he is largely overcome.  Did have a trial of medication for a period of time but felt this was not helpful for him.  Denies any significant depressive symptoms at this time.    Today we discussed routine health preventive measures.  Discussed checking cholesterol and blood sugar.  Discussed immunizations.  Discussed other health screening and recommendations.  Reviewed exercise and nutrition.      Does have some residual right-sided shoulder pain after having had a rotator cuff injury requiring surgery in the past.  Also has history of labral tear on the right hip, does note some discomfort at times but is able to play hockey without significant difficulty.    ROS:  Complete review of systems is obtained.  Other than the specific considerations noted " above complete review of systems is negative.      Objective:   Medications:  Current Outpatient Medications   Medication     cetirizine HCl (ZYRTEC ALLERGY) 10 MG CAPS     inFLIXimab (REMICADE) 100 MG injection     omeprazole (PRILOSEC) 20 MG DR capsule     citalopram (CELEXA) 20 MG tablet     No current facility-administered medications for this visit.        Allergies:   No Known Allergies     Social History     Socioeconomic History     Marital status:      Spouse name: Not on file     Number of children: Not on file     Years of education: Not on file     Highest education level: Not on file   Occupational History     Not on file   Tobacco Use     Smoking status: Former Smoker     Quit date: 2003     Years since quittin.3     Smokeless tobacco: Never Used   Substance and Sexual Activity     Alcohol use: No     Drug use: No     Sexual activity: Not on file   Other Topics Concern     Parent/sibling w/ CABG, MI or angioplasty before 65F 55M? No   Social History Narrative     Not on file     Social Determinants of Health     Financial Resource Strain: Not on file   Food Insecurity: Not on file   Transportation Needs: Not on file   Physical Activity: Not on file   Stress: Not on file   Social Connections: Not on file   Intimate Partner Violence: Not on file   Housing Stability: Not on file       Family History   Problem Relation Age of Onset     No Known Problems Mother      No Known Problems Father         Most Recent Immunizations   Administered Date(s) Administered     COVID-19,PF,Pfizer (12+ Yrs) 2021     Flu, Unspecified 2020     Influenza (IIV3) PF 2013     Influenza Vaccine IM > 6 months Valent IIV4 (Alfuria,Fluzone) 2016     Influenza Vaccine, 6+MO IM (QUADRIVALENT W/PRESERVATIVES) 2019     Pneumo Conj 13-V (2010&after) 2016     Pneumococcal 23 valent 2013     TDAP Vaccine (Adacel) 2012     Tdap (Adacel,Boostrix) 2012     Tdap (Adult)  "Unspecified Formulation 02/18/2010   Pended Date(s) Pended     COVID-19,PF,Pfizer (12+ Yrs) 11/26/2021     Influenza Vaccine IM > 6 months Valent IIV4 (Alfuria,Fluzone) 11/26/2021        Wt Readings from Last 3 Encounters:   11/26/21 98.9 kg (218 lb)   07/02/21 96.6 kg (213 lb)   05/04/21 99.8 kg (220 lb)        BP Readings from Last 6 Encounters:   11/26/21 124/76   07/02/21 120/72   05/04/21 128/78   03/09/20 122/72   11/05/19 118/72   10/31/13 135/74        PHYSICAL EXAM:    /78   Pulse 64   Ht 1.854 m (6' 1\")   Wt 98.9 kg (218 lb)   SpO2 99%   BMI 28.76 kg/m       General Appearance:    Alert, cooperative, no distress   Eyes:   No scleral icterus or conjunctival irritation       Ears:    Normal TM's and external ear canals, both ears   Throat:   Lips, mucosa, and tongue normal; teeth and gums normal   Neck:   Supple, symmetrical, trachea midline, no adenopathy;        thyroid:  No enlargement/tenderness/nodules   Lungs:     Clear to auscultation bilaterally, respirations unlabored, no wheezes or crackles   Heart:    Regular rate and rhythm,  No murmur   Abdomen:    Soft, no distention, no tenderness on palpation, no masses, no organomegaly     Extremities:  No edema, no joint swelling or redness, no evidence of any injuries   Skin:  No concerning skin findings, no suspicious moles, no rashes   Neurologic:  On gross examination there is no motor or sensory deficit.  Patient walks with a normal gait     Answers for HPI/ROS submitted by the patient on 11/26/2021  If you checked off any problems, how difficult have these problems made it for you to do your work, take care of things at home, or get along with other people?: Not difficult at all  PHQ9 TOTAL SCORE: 0  Frequency of exercise:: 2-3 days/week  Getting at least 3 servings of Calcium per day:: Yes  Diet:: Regular (no restrictions)  Taking medications regularly:: No  Medication side effects:: Not applicable  Bi-annual eye exam:: Yes  Dental care " twice a year:: Yes  Sleep apnea or symptoms of sleep apnea:: None  Additional concerns today:: No  Duration of exercise:: 45-60 minutes  Barriers to taking medications:: Not applicable

## 2021-11-27 LAB
CHOLEST SERPL-MCNC: 130 MG/DL
FASTING STATUS PATIENT QL REPORTED: YES
FASTING STATUS PATIENT QL REPORTED: YES
GLUCOSE BLD-MCNC: 79 MG/DL (ref 70–125)
HDLC SERPL-MCNC: 34 MG/DL
LDLC SERPL CALC-MCNC: 74 MG/DL
TRIGL SERPL-MCNC: 109 MG/DL

## 2021-11-27 ASSESSMENT — PATIENT HEALTH QUESTIONNAIRE - PHQ9: SUM OF ALL RESPONSES TO PHQ QUESTIONS 1-9: 0

## 2021-12-22 ENCOUNTER — TRANSFERRED RECORDS (OUTPATIENT)
Dept: HEALTH INFORMATION MANAGEMENT | Facility: CLINIC | Age: 46
End: 2021-12-22
Payer: COMMERCIAL

## 2022-01-19 ENCOUNTER — VIRTUAL VISIT (OUTPATIENT)
Dept: URGENT CARE | Facility: CLINIC | Age: 47
End: 2022-01-19
Payer: COMMERCIAL

## 2022-01-19 DIAGNOSIS — J01.90 ACUTE SINUSITIS WITH SYMPTOMS > 10 DAYS: Primary | ICD-10-CM

## 2022-01-19 PROCEDURE — 99213 OFFICE O/P EST LOW 20 MIN: CPT | Mod: GT

## 2022-01-19 NOTE — PROGRESS NOTES
Valentin is a 46 year old who is being evaluated via a billable video visit.      Video Start Time: 5:21 PM    Assessment & Plan     Acute sinusitis with symptoms > 10 days  - amoxicillin-clavulanate (AUGMENTIN) 875-125 MG tablet; Take 1 tablet by mouth 2 times daily for 10 days    I will treat for sinus infection. If worsening cough, breathing difficulties or fevers follow up for further evaluation.    Izabela Arias PA-C  Virtual Urgent Care  [facility] VIRTUAL URGENT CARE    Subjective   Valentin is a 46 year old who presents for the following health issues: cold and sinus symptoms    HPI - Patient has chronic allergy drainage and in the last 4-5 days has developed sinus pressure, severe congestion, runny nose and noisy cough. He has not had fever, he has not had body aches. He took a Covid test today that was negative. He has not had difficulty wheezing.     Review of Systems   Constitutional, HEENT, cardiovascular, pulmonary, gi and gu systems are negative, except as otherwise noted.      Objective           Vitals:  No vitals were obtained today due to virtual visit.    Physical Exam   GENERAL: alert and no distress  EYES: Eyes grossly normal to inspection.  No discharge or erythema, or obvious scleral/conjunctival abnormalities.  HENT: normal cephalic/atraumatic, ear canals and TM's normal, rhinorrhea purulent, oropharynx clear, oral mucous membranes moist and sinuses: maxillary tenderness on left  RESP: No audible wheeze, cough, or visible cyanosis.  No visible retractions or increased work of breathing.    SKIN: Visible skin clear. No significant rash, abnormal pigmentation or lesions.  NEURO: Cranial nerves grossly intact.  Mentation and speech appropriate for age.  PSYCH: Mentation appears normal, affect normal/bright, judgement and insight intact, normal speech and appearance well-groomed.        Video-Visit Details    Type of service:  Video Visit    Video End Time:5:32 PM    Originating Location  (pt. Location): Home    Distant Location (provider location):  [facility]     Platform used for Video Visit: Addy

## 2022-02-11 ENCOUNTER — TRANSFERRED RECORDS (OUTPATIENT)
Dept: HEALTH INFORMATION MANAGEMENT | Facility: CLINIC | Age: 47
End: 2022-02-11
Payer: COMMERCIAL

## 2022-04-01 ENCOUNTER — TRANSFERRED RECORDS (OUTPATIENT)
Dept: HEALTH INFORMATION MANAGEMENT | Facility: CLINIC | Age: 47
End: 2022-04-01
Payer: COMMERCIAL

## 2022-04-15 ENCOUNTER — OFFICE VISIT (OUTPATIENT)
Dept: FAMILY MEDICINE | Facility: CLINIC | Age: 47
End: 2022-04-15
Payer: COMMERCIAL

## 2022-04-15 VITALS
OXYGEN SATURATION: 98 % | SYSTOLIC BLOOD PRESSURE: 124 MMHG | DIASTOLIC BLOOD PRESSURE: 76 MMHG | BODY MASS INDEX: 29.03 KG/M2 | HEART RATE: 75 BPM | HEIGHT: 73 IN | WEIGHT: 219 LBS

## 2022-04-15 DIAGNOSIS — M54.12 CERVICAL RADICULOPATHY: Primary | ICD-10-CM

## 2022-04-15 DIAGNOSIS — H69.92 ETD (EUSTACHIAN TUBE DYSFUNCTION), LEFT: ICD-10-CM

## 2022-04-15 PROCEDURE — 99213 OFFICE O/P EST LOW 20 MIN: CPT | Performed by: FAMILY MEDICINE

## 2022-04-15 NOTE — PROGRESS NOTES
"Edwin Becker  /[birth year]   Pulse 75   Ht 1.854 m (6' 1\")   Wt 99.3 kg (219 lb)   SpO2 98%   BMI 28.89 kg/m       Assessment/Plan:                Edwin was seen today for back pain, neck pain, numbness and ear problem.    Diagnoses and all orders for this visit:    Cervical radiculopathy    ETD (Eustachian tube dysfunction), left         DISCUSSION  He has cervicalgia with some signs of cervical radiculopathy without red flag symptoms this all likely stems from a more distant injury.  Discussed options for further evaluation and treatment.  Will refer to the spine clinic at this time.  No indication for medication therapy or imaging at this time.    Discussed eustachian tube dysfunction, feel likely secondary to mild allergy symptoms.  Monitor and can sinew allergy treatment if this becomes a bigger concern let me know.  Subjective:     HPI:    Edwin Becker is a 46 year old male is here today concerned regarding intermittent plugging of the left ear and concerns regarding his neck and upper back pain with radiation of discomfort and a tingling in the left arm.      He does acknowledge some nasal congestion and mild allergy type symptoms for which she is taking Zyrtec currently.  He states that for a few weeks he has had on and off symptoms of a gurgling sound in his left ear that come and go.  Feels somewhat plugged up at times.    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  He states he did some physical therapy.  He developed pain symptoms and some radiation of symptoms into the left arm.  He had an MRI scan, the report was reviewed from 2014.  He has had persistent symptoms into the left arm that consist of a burning type pain and tingling in the left hand.  Denies weakness or any significant progression of symptoms.  Movement of his neck will often exacerbate the symptoms into the left arm.  Does have some discomfort in the upper back in the " region between the shoulder blades.  This is exacerbated by certain movements especially movements of the neck.  He otherwise feels well.  Denies any symptoms in the lower extremities.        ROS:  Complete review of systems is obtained.  Other than the specific considerations noted above complete review of systems is negative.          Objective:   Medications:  Current Outpatient Medications   Medication     cetirizine HCl (ZYRTEC ALLERGY) 10 MG CAPS     inFLIXimab (REMICADE) 100 MG injection     omeprazole (PRILOSEC) 20 MG DR capsule     citalopram (CELEXA) 20 MG tablet     No current facility-administered medications for this visit.        Allergies:   No Known Allergies     Social History     Socioeconomic History     Marital status:      Spouse name: Not on file     Number of children: Not on file     Years of education: Not on file     Highest education level: Not on file   Occupational History     Not on file   Tobacco Use     Smoking status: Former Smoker     Quit date: 2003     Years since quittin.7     Smokeless tobacco: Never Used   Substance and Sexual Activity     Alcohol use: No     Drug use: No     Sexual activity: Not on file   Other Topics Concern     Parent/sibling w/ CABG, MI or angioplasty before 65F 55M? No   Social History Narrative     Not on file     Social Determinants of Health     Financial Resource Strain: Not on file   Food Insecurity: Not on file   Transportation Needs: Not on file   Physical Activity: Not on file   Stress: Not on file   Social Connections: Not on file   Intimate Partner Violence: Not on file   Housing Stability: Not on file       Family History   Problem Relation Age of Onset     No Known Problems Mother      No Known Problems Father         Most Recent Immunizations   Administered Date(s) Administered     COVID-19,PF,Pfizer (12+ Yrs) 2021     Flu, Unspecified 2020     Influenza (IIV3) PF 2013     Influenza Vaccine IM > 6 months  "Valent IIV4 (Alfuria,Fluzone) 11/26/2021     Influenza Vaccine, 6+MO IM (QUADRIVALENT W/PRESERVATIVES) 11/05/2019     Pneumo Conj 13-V (2010&after) 12/02/2016     Pneumococcal 23 valent 11/05/2013     TDAP Vaccine (Adacel) 09/27/2012     Tdap (Adacel,Boostrix) 09/27/2012     Tdap (Adult) Unspecified Formulation 02/18/2010        Wt Readings from Last 3 Encounters:   04/15/22 99.3 kg (219 lb)   11/26/21 98.9 kg (218 lb)   07/02/21 96.6 kg (213 lb)        BP Readings from Last 6 Encounters:   04/15/22 124/76   11/26/21 124/76   07/02/21 120/72   05/04/21 128/78   03/09/20 122/72   11/05/19 118/72            PHYSICAL EXAM:    /76   Pulse 75   Ht 1.854 m (6' 1\")   Wt 99.3 kg (219 lb)   SpO2 98%   BMI 28.89 kg/m       General: Patient alert no signs of distress    Examination of his ears reveals normal-appearing eardrums with clear ear canals and no obvious abnormality.    Examination of his neck reveals good range of motion but reports increased symptoms of burning pain and tingling in the hand on the left with extension, flexion and rotation.  Extension causes the most symptoms.  There is no evidence of any weakness in the upper extremities.  Sensation is grossly intact.  Reflexes are barely elicitable.  Using monofilament line no detectable difference subjectively between the right and left.                          "

## 2022-05-18 ENCOUNTER — TRANSFERRED RECORDS (OUTPATIENT)
Dept: HEALTH INFORMATION MANAGEMENT | Facility: CLINIC | Age: 47
End: 2022-05-18
Payer: COMMERCIAL

## 2022-05-20 ENCOUNTER — TRANSFERRED RECORDS (OUTPATIENT)
Dept: HEALTH INFORMATION MANAGEMENT | Facility: CLINIC | Age: 47
End: 2022-05-20
Payer: COMMERCIAL

## 2022-05-20 LAB
ALT SERPL-CCNC: 25 IU/L (ref 0–44)
AST SERPL-CCNC: 21 IU/L (ref 0–40)

## 2022-06-09 ENCOUNTER — E-VISIT (OUTPATIENT)
Dept: URGENT CARE | Facility: CLINIC | Age: 47
End: 2022-06-09
Payer: COMMERCIAL

## 2022-06-09 DIAGNOSIS — J20.9 ACUTE BRONCHITIS WITH SYMPTOMS > 10 DAYS: Primary | ICD-10-CM

## 2022-06-09 PROCEDURE — 99421 OL DIG E/M SVC 5-10 MIN: CPT | Performed by: NURSE PRACTITIONER

## 2022-06-09 RX ORDER — DOXYCYCLINE HYCLATE 100 MG
100 TABLET ORAL 2 TIMES DAILY
Qty: 14 TABLET | Refills: 0 | Status: SHIPPED | OUTPATIENT
Start: 2022-06-09 | End: 2022-06-16

## 2022-06-10 NOTE — PATIENT INSTRUCTIONS
"    Dear Edwin Becker    After reviewing your responses, I've been able to diagnose you with \"Bronchitis\" which is a common infection of your lungs. While this is most commonly caused by a virus, the symptoms you have given suggest you should be treated with antibiotics.     I have sent doxycycline to your pharmacy to treat this infection.     It is important that you take all of your prescribed medication even if your symptoms are improving after a few doses. Taking all of your medicine helps prevent the symptoms from returning.     If your symptoms worsen, you develop chest pain or shortness of breath, fevers over 101, or are not improving in 5 days, please contact your primary care provider for an appointment or visit any of our convenient Walk-in Care or Urgent Care Centers to be seen which can be found on our website here.    Thanks again for choosing us as your health care partner,    Radha Cortez, CNP    "

## 2022-07-11 ENCOUNTER — TRANSFERRED RECORDS (OUTPATIENT)
Dept: HEALTH INFORMATION MANAGEMENT | Facility: CLINIC | Age: 47
End: 2022-07-11

## 2022-09-02 ENCOUNTER — TRANSFERRED RECORDS (OUTPATIENT)
Dept: HEALTH INFORMATION MANAGEMENT | Facility: CLINIC | Age: 47
End: 2022-09-02

## 2022-09-27 ENCOUNTER — OFFICE VISIT (OUTPATIENT)
Dept: FAMILY MEDICINE | Facility: CLINIC | Age: 47
End: 2022-09-27
Payer: COMMERCIAL

## 2022-09-27 VITALS
HEIGHT: 73 IN | BODY MASS INDEX: 29.03 KG/M2 | HEART RATE: 81 BPM | SYSTOLIC BLOOD PRESSURE: 126 MMHG | RESPIRATION RATE: 18 BRPM | OXYGEN SATURATION: 99 % | WEIGHT: 219 LBS | DIASTOLIC BLOOD PRESSURE: 76 MMHG

## 2022-09-27 DIAGNOSIS — N52.9 ERECTILE DYSFUNCTION, UNSPECIFIED ERECTILE DYSFUNCTION TYPE: ICD-10-CM

## 2022-09-27 DIAGNOSIS — Z00.01 ENCOUNTER FOR ROUTINE ADULT MEDICAL EXAM WITH ABNORMAL FINDINGS: Primary | ICD-10-CM

## 2022-09-27 DIAGNOSIS — R53.83 FATIGUE, UNSPECIFIED TYPE: ICD-10-CM

## 2022-09-27 DIAGNOSIS — K51.90 ULCERATIVE COLITIS WITHOUT COMPLICATIONS, UNSPECIFIED LOCATION (H): ICD-10-CM

## 2022-09-27 LAB
ALBUMIN SERPL BCG-MCNC: 4.5 G/DL (ref 3.5–5.2)
ALP SERPL-CCNC: 87 U/L (ref 40–129)
ALT SERPL W P-5'-P-CCNC: 26 U/L (ref 10–50)
ANION GAP SERPL CALCULATED.3IONS-SCNC: 10 MMOL/L (ref 7–15)
AST SERPL W P-5'-P-CCNC: 29 U/L (ref 10–50)
BASOPHILS # BLD AUTO: 0 10E3/UL (ref 0–0.2)
BASOPHILS NFR BLD AUTO: 0 %
BILIRUB SERPL-MCNC: 0.7 MG/DL
BUN SERPL-MCNC: 11.5 MG/DL (ref 6–20)
CALCIUM SERPL-MCNC: 9.2 MG/DL (ref 8.6–10)
CHLORIDE SERPL-SCNC: 102 MMOL/L (ref 98–107)
CHOLEST SERPL-MCNC: 133 MG/DL
CREAT SERPL-MCNC: 0.94 MG/DL (ref 0.67–1.17)
DEPRECATED HCO3 PLAS-SCNC: 25 MMOL/L (ref 22–29)
EOSINOPHIL # BLD AUTO: 0.2 10E3/UL (ref 0–0.7)
EOSINOPHIL NFR BLD AUTO: 2 %
ERYTHROCYTE [DISTWIDTH] IN BLOOD BY AUTOMATED COUNT: 12.8 % (ref 10–15)
GFR SERPL CREATININE-BSD FRML MDRD: >90 ML/MIN/1.73M2
GLUCOSE SERPL-MCNC: 81 MG/DL (ref 70–99)
HCT VFR BLD AUTO: 43.2 % (ref 40–53)
HDLC SERPL-MCNC: 34 MG/DL
HGB BLD-MCNC: 15.4 G/DL (ref 13.3–17.7)
IMM GRANULOCYTES # BLD: 0 10E3/UL
IMM GRANULOCYTES NFR BLD: 0 %
LDLC SERPL CALC-MCNC: 72 MG/DL
LYMPHOCYTES # BLD AUTO: 2.3 10E3/UL (ref 0.8–5.3)
LYMPHOCYTES NFR BLD AUTO: 31 %
MCH RBC QN AUTO: 29.6 PG (ref 26.5–33)
MCHC RBC AUTO-ENTMCNC: 35.6 G/DL (ref 31.5–36.5)
MCV RBC AUTO: 83 FL (ref 78–100)
MONOCYTES # BLD AUTO: 0.4 10E3/UL (ref 0–1.3)
MONOCYTES NFR BLD AUTO: 6 %
NEUTROPHILS # BLD AUTO: 4.6 10E3/UL (ref 1.6–8.3)
NEUTROPHILS NFR BLD AUTO: 61 %
NONHDLC SERPL-MCNC: 99 MG/DL
PLATELET # BLD AUTO: 185 10E3/UL (ref 150–450)
POTASSIUM SERPL-SCNC: 4.1 MMOL/L (ref 3.4–5.3)
PROT SERPL-MCNC: 7.2 G/DL (ref 6.4–8.3)
RBC # BLD AUTO: 5.21 10E6/UL (ref 4.4–5.9)
SODIUM SERPL-SCNC: 137 MMOL/L (ref 136–145)
TRIGL SERPL-MCNC: 137 MG/DL
TSH SERPL DL<=0.005 MIU/L-ACNC: 1.95 UIU/ML (ref 0.3–4.2)
WBC # BLD AUTO: 7.5 10E3/UL (ref 4–11)

## 2022-09-27 PROCEDURE — 90714 TD VACC NO PRESV 7 YRS+ IM: CPT | Performed by: FAMILY MEDICINE

## 2022-09-27 PROCEDURE — 80061 LIPID PANEL: CPT | Performed by: FAMILY MEDICINE

## 2022-09-27 PROCEDURE — 99396 PREV VISIT EST AGE 40-64: CPT | Mod: 25 | Performed by: FAMILY MEDICINE

## 2022-09-27 PROCEDURE — 90471 IMMUNIZATION ADMIN: CPT | Performed by: FAMILY MEDICINE

## 2022-09-27 PROCEDURE — 80050 GENERAL HEALTH PANEL: CPT | Performed by: FAMILY MEDICINE

## 2022-09-27 PROCEDURE — 99214 OFFICE O/P EST MOD 30 MIN: CPT | Mod: 25 | Performed by: FAMILY MEDICINE

## 2022-09-27 PROCEDURE — 82306 VITAMIN D 25 HYDROXY: CPT | Performed by: FAMILY MEDICINE

## 2022-09-27 PROCEDURE — 36415 COLL VENOUS BLD VENIPUNCTURE: CPT | Performed by: FAMILY MEDICINE

## 2022-09-27 RX ORDER — SILDENAFIL 50 MG/1
50 TABLET, FILM COATED ORAL DAILY PRN
Qty: 30 TABLET | Refills: 3 | Status: SHIPPED | OUTPATIENT
Start: 2022-09-27 | End: 2023-03-01

## 2022-09-27 ASSESSMENT — ENCOUNTER SYMPTOMS
ARTHRALGIAS: 0
MYALGIAS: 0
CHILLS: 0
HEADACHES: 0
COUGH: 0
SHORTNESS OF BREATH: 0
DIARRHEA: 0
NERVOUS/ANXIOUS: 0
DYSURIA: 0
WEAKNESS: 0
PALPITATIONS: 0
CONSTIPATION: 0
DIZZINESS: 0
SORE THROAT: 0
EYE PAIN: 0
FEVER: 0
HEMATURIA: 0
HEARTBURN: 0
JOINT SWELLING: 0
NAUSEA: 0
PARESTHESIAS: 0
HEMATOCHEZIA: 0
ABDOMINAL PAIN: 0
FREQUENCY: 0

## 2022-09-27 ASSESSMENT — PAIN SCALES - GENERAL: PAINLEVEL: NO PAIN (0)

## 2022-09-27 NOTE — PROGRESS NOTES
"Edwin Becker  /[birth year]   Pulse 81   Resp 18   Ht 1.854 m (6' 1\")   Wt 99.3 kg (219 lb)   SpO2 99%   BMI 28.89 kg/m       Assessment/Plan:                Diagnoses and all orders for this visit:    Encounter for routine adult medical exam with abnormal findings  -     Lipid panel reflex to direct LDL Fasting    Erectile dysfunction, unspecified erectile dysfunction type  -     sildenafil (VIAGRA) 50 MG tablet; Take 1 tablet (50 mg) by mouth daily as needed    Fatigue, unspecified type  -     TSH with free T4 reflex  -     CBC with Platelets & Differential    Ulcerative colitis without complications, unspecified location (H)  -     Comprehensive metabolic panel  -     Vitamin D Deficiency  -     CBC with Platelets & Differential    Other orders  -     TD PRESERV FREE, IM (7+ YRS) (DECAVAC/TENIVAC)         DISCUSSION  See discussion below.  Obtain labs as above.  Tetanus shot updated today.  Subjective:     HPI:    Edwin Becker is a 47 year old male is here today for physical.  He has ulcerative colitis.  He is treated with Inflectra.  Reports no concerns.  Does have regular colonoscopies and follow-up with his gastroenterologist for managing this.  He otherwise is overall doing well.  In the past has had some mental health concerns which she reports are resolved at this point.  He has some musculoskeletal pain issues which he deals with.  Continues to work as an  for [employer].  He does not smoke.  He is  has young children.  He is active and plays adult playing hockey.  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    He does reports more fatigue issues recently reviewed sleep hygiene but discussed checking some additional labs.  He has history of a lower than ideal HDL cholesterol but generally other components of cholesterol are favorable.  Discussed diabetes screening and other lab screening as noted.  Reviewed and discussed immunizations.  Will update tetanus shot today.  " He will seek other immunizations at a later date to ensure that if he has any reactions it does not interfere.    ROS:  Complete review of systems is obtained.  Other than the specific considerations noted above complete review of systems is negative.          Objective:   Medications:  Current Outpatient Medications   Medication     cetirizine HCl (ZYRTEC ALLERGY) 10 MG CAPS     inFLIXimab (REMICADE) 100 MG injection     omeprazole (PRILOSEC) 20 MG DR capsule     sildenafil (VIAGRA) 50 MG tablet     No current facility-administered medications for this visit.        Allergies:   No Known Allergies     Social History     Socioeconomic History     Marital status:      Spouse name: Not on file     Number of children: Not on file     Years of education: Not on file     Highest education level: Not on file   Occupational History     Not on file   Tobacco Use     Smoking status: Former Smoker     Quit date: 2003     Years since quittin.1     Smokeless tobacco: Never Used   Substance and Sexual Activity     Alcohol use: No     Drug use: No     Sexual activity: Not on file   Other Topics Concern     Parent/sibling w/ CABG, MI or angioplasty before 65F 55M? No   Social History Narrative     Not on file     Social Determinants of Health     Financial Resource Strain: Not on file   Food Insecurity: Not on file   Transportation Needs: Not on file   Physical Activity: Not on file   Stress: Not on file   Social Connections: Not on file   Intimate Partner Violence: Not on file   Housing Stability: Not on file       Family History   Problem Relation Age of Onset     No Known Problems Mother      No Known Problems Father         Most Recent Immunizations   Administered Date(s) Administered     COVID-19,PF,Pfizer (12+ Yrs) 2021     Flu, Unspecified 2020     Influenza (IIV3) PF 2013     Influenza Vaccine IM > 6 months Valent IIV4 (Alfuria,Fluzone) 2021     Influenza Vaccine, 6+MO IM  "(QUADRIVALENT W/PRESERVATIVES) 11/05/2019     Pneumo Conj 13-V (2010&after) 12/02/2016     Pneumococcal 23 valent 11/05/2013     TDAP Vaccine (Adacel) 09/27/2012     Tdap (Adacel,Boostrix) 09/27/2012     Tdap (Adult) Unspecified Formulation 02/18/2010   Pended Date(s) Pended     TD (ADULT, 7+) 09/27/2022        Wt Readings from Last 3 Encounters:   09/27/22 99.3 kg (219 lb)   04/15/22 99.3 kg (219 lb)   11/26/21 98.9 kg (218 lb)        BP Readings from Last 6 Encounters:   09/27/22 126/76   04/15/22 124/76   11/26/21 124/76   07/02/21 120/72   05/04/21 128/78   03/09/20 122/72        PHYSICAL EXAM:    /76   Pulse 81   Resp 18   Ht 1.854 m (6' 1\")   Wt 99.3 kg (219 lb)   SpO2 99%   BMI 28.89 kg/m       General Appearance:    Alert, cooperative, no distress   Eyes:   No scleral icterus or conjunctival irritation       Ears:    Normal TM's and external ear canals, both ears   Throat:   Lips, mucosa, and tongue normal; teeth and gums normal   Neck:   Supple, symmetrical, trachea midline, no adenopathy;        thyroid:  No enlargement/tenderness/nodules   Lungs:     Clear to auscultation bilaterally, respirations unlabored, no wheezes or crackles   Heart:    Regular rate and rhythm,  No murmur   Abdomen:    Soft, no distention, no tenderness on palpation, no masses, no organomegaly     Extremities:  No edema, no joint swelling or redness, no evidence of any injuries   Skin:  No concerning skin findings, no suspicious moles, no rashes   Neurologic:  On gross examination there is no motor or sensory deficit.  Patient walks with a normal gait       Answers for HPI/ROS submitted by the patient on 9/27/2022  Frequency of exercise:: 2-3 days/week  Getting at least 3 servings of Calcium per day:: Yes  Diet:: Regular (no restrictions)  Taking medications regularly:: Yes  Medication side effects:: None  Bi-annual eye exam:: Yes  Dental care twice a year:: Yes  Sleep apnea or symptoms of sleep apnea:: None  abdominal " pain: No  Blood in stool: No  Blood in urine: No  chest pain: No  chills: No  congestion: No  constipation: No  cough: No  diarrhea: No  dizziness: No  ear pain: No  eye pain: No  nervous/anxious: No  fever: No  frequency: No  genital sores: No  headaches: No  hearing loss: No  heartburn: No  arthralgias: No  joint swelling: No  peripheral edema: No  mood changes: No  myalgias: No  nausea: No  dysuria: No  palpitations: No  Skin sensation changes: No  sore throat: No  urgency: No  rash: No  shortness of breath: No  visual disturbance: No  weakness: No  impotence: Yes  penile discharge: No  Additional concerns today:: No  Duration of exercise:: 45-60 minutes

## 2022-09-28 LAB — DEPRECATED CALCIDIOL+CALCIFEROL SERPL-MC: 31 UG/L (ref 20–75)

## 2022-10-21 ENCOUNTER — TRANSFERRED RECORDS (OUTPATIENT)
Dept: HEALTH INFORMATION MANAGEMENT | Facility: CLINIC | Age: 47
End: 2022-10-21

## 2022-10-21 LAB
ALT SERPL-CCNC: 31 IU/L (ref 0–44)
AST SERPL-CCNC: 25 IU/L (ref 0–40)

## 2022-10-23 ENCOUNTER — HEALTH MAINTENANCE LETTER (OUTPATIENT)
Age: 47
End: 2022-10-23

## 2022-12-01 ENCOUNTER — TRANSFERRED RECORDS (OUTPATIENT)
Dept: HEALTH INFORMATION MANAGEMENT | Facility: CLINIC | Age: 47
End: 2022-12-01

## 2023-01-05 ENCOUNTER — VIRTUAL VISIT (OUTPATIENT)
Dept: INTERNAL MEDICINE | Facility: CLINIC | Age: 48
End: 2023-01-05
Payer: COMMERCIAL

## 2023-01-05 DIAGNOSIS — R05.3 PERSISTENT COUGH FOR 3 WEEKS OR LONGER: Primary | ICD-10-CM

## 2023-01-05 PROCEDURE — 99213 OFFICE O/P EST LOW 20 MIN: CPT | Mod: 95 | Performed by: INTERNAL MEDICINE

## 2023-01-05 RX ORDER — ALBUTEROL SULFATE 90 UG/1
1-2 AEROSOL, METERED RESPIRATORY (INHALATION) EVERY 6 HOURS PRN
Qty: 18 G | Refills: 0 | Status: SHIPPED | OUTPATIENT
Start: 2023-01-05 | End: 2024-08-26

## 2023-01-05 RX ORDER — AZITHROMYCIN 250 MG/1
TABLET, FILM COATED ORAL
Qty: 6 TABLET | Refills: 0 | Status: SHIPPED | OUTPATIENT
Start: 2023-01-05 | End: 2023-01-10

## 2023-01-05 ASSESSMENT — PATIENT HEALTH QUESTIONNAIRE - PHQ9
SUM OF ALL RESPONSES TO PHQ QUESTIONS 1-9: 0
10. IF YOU CHECKED OFF ANY PROBLEMS, HOW DIFFICULT HAVE THESE PROBLEMS MADE IT FOR YOU TO DO YOUR WORK, TAKE CARE OF THINGS AT HOME, OR GET ALONG WITH OTHER PEOPLE: NOT DIFFICULT AT ALL
SUM OF ALL RESPONSES TO PHQ QUESTIONS 1-9: 0

## 2023-01-05 NOTE — PROGRESS NOTES
Valentin is a 47 year old who is being evaluated via a billable video visit.      How would you like to obtain your AVS? MyChart  If the video visit is dropped, the invitation should be resent by: Text to cell phone: [phone]  Will anyone else be joining your video visit? No    Assessment & Plan   Persistent cough for 3 weeks or longer  Symptoms ongoing for ~6 weeks. Patient is immunosuppressed on Remicaide. Favor being a bit more aggressive with antibiotic therapy. Will write for Zpak. He was wondering about albuterol inhaler PRN. Unclear if it will help given he was unsure if he's wheezing or if he felt chest tightness, but I did write for this in case he'd like to try. If no improvement or symptoms worsen, recommend in-person evaluation with any available provider.  - azithromycin (ZITHROMAX) 250 MG tablet; Take 2 tablets (500 mg) by mouth daily for 1 day, THEN 1 tablet (250 mg) daily for 4 days. Take 2 tablets (500 mg) by mouth daily for 1 day, THEN 1 tablet (250 mg) daily for 4 days.  - albuterol (PROAIR HFA/PROVENTIL HFA/VENTOLIN HFA) 108 (90 Base) MCG/ACT inhaler; Inhale 1-2 puffs into the lungs every 6 hours as needed for shortness of breath, wheezing or cough    F/u with regular PCP at regular interval or sooner PRN    Ankit Santana MD  [facility]    Subjective   Valentin is a 47 year old who presents for a same day acute care virtual video visit with chief concern of: persistent cough. This is the first time I have met Valentin. He reports he first got a cough around Thanksgiving. Got a bit better but has persisted. Productive of phlegm usually. No blood. Mild sinus congestion. No f/c. No known diagnosis back in November. He is on Remicaide for UC. Last infusion was     Review of Systems   Constitutional, HEENT, pulmonary systems are negative, except as otherwise noted.      Objective       Vitals: No vitals were obtained today due to virtual visit.    Physical Exam   GENERAL:  Healthy, alert and no distress  EYES: Eyes grossly normal to inspection.  No discharge or erythema, or obvious scleral/conjunctival abnormalities.  RESP: No audible wheeze, cough, or visible cyanosis.  No visible retractions or increased work of breathing.    SKIN: Visible skin clear. No significant rash, abnormal pigmentation or lesions.  NEURO: Cranial nerves grossly intact.  Mentation and speech appropriate for age.  PSYCH: Mentation appears normal, affect normal/bright, judgement and insight intact, normal speech and appearance well-groomed.    Video-Visit Details  Type of service: Video Visit   Visit Length: 2:01 PM - 2:07 PM  Originating Location (pt. Location): Home  Distant Location (provider location):  On-site  Platform used for Video Visit: Addy

## 2023-01-19 ENCOUNTER — TRANSFERRED RECORDS (OUTPATIENT)
Dept: HEALTH INFORMATION MANAGEMENT | Facility: CLINIC | Age: 48
End: 2023-01-19
Payer: COMMERCIAL

## 2023-02-28 DIAGNOSIS — N52.9 ERECTILE DYSFUNCTION, UNSPECIFIED ERECTILE DYSFUNCTION TYPE: ICD-10-CM

## 2023-03-01 ENCOUNTER — TRANSFERRED RECORDS (OUTPATIENT)
Dept: HEALTH INFORMATION MANAGEMENT | Facility: CLINIC | Age: 48
End: 2023-03-01
Payer: COMMERCIAL

## 2023-03-01 RX ORDER — SILDENAFIL 50 MG/1
TABLET, FILM COATED ORAL
Qty: 30 TABLET | Refills: 3 | Status: SHIPPED | OUTPATIENT
Start: 2023-03-01 | End: 2023-07-28

## 2023-03-01 NOTE — TELEPHONE ENCOUNTER
"Last Written Prescription Date:  09/27/2022  Last Fill Quantity: 30,  # refills: 3   Last office visit provider:  09/07/2022     Requested Prescriptions   Pending Prescriptions Disp Refills     sildenafil (VIAGRA) 50 MG tablet [Pharmacy Med Name: SILDENAFIL CITRATE 50MG TABS] 30 tablet 3     Sig: TAKE 1 TABLET BY MOUTH ONCE DAILY AS NEEDED       Erectile Dysfuction Protocol Passed - 3/1/2023  4:16 PM        Passed - Absence of nitrates on medication list        Passed - Absence of Alpha Blockers on Med list        Passed - Recent (12 mo) or future (30 days) visit within the authorizing provider's specialty     Patient has had an office visit with the authorizing provider or a provider within the authorizing providers department within the previous 12 mos or has a future within next 30 days. See \"Patient Info\" tab in inbasket, or \"Choose Columns\" in Meds & Orders section of the refill encounter.              Passed - Medication is active on med list        Passed - Patient is age 18 or older             Sergio Purvis RN 03/01/23 4:20 PM  "

## 2023-03-09 ENCOUNTER — HOSPITAL ENCOUNTER (OUTPATIENT)
Dept: CT IMAGING | Facility: CLINIC | Age: 48
Discharge: HOME | End: 2023-03-09
Attending: INTERNAL MEDICINE | Admitting: INTERNAL MEDICINE
Payer: COMMERCIAL

## 2023-03-09 ENCOUNTER — TRANSFERRED RECORDS (OUTPATIENT)
Dept: HEALTH INFORMATION MANAGEMENT | Facility: CLINIC | Age: 48
End: 2023-03-09
Payer: COMMERCIAL

## 2023-03-09 DIAGNOSIS — K51.00 ULCERATIVE PANCOLITIS (H): ICD-10-CM

## 2023-03-09 PROCEDURE — 74177 CT ABD & PELVIS W/CONTRAST: CPT

## 2023-03-09 PROCEDURE — 250N000011 HC RX IP 250 OP 636: Performed by: INTERNAL MEDICINE

## 2023-03-09 RX ORDER — IOPAMIDOL 755 MG/ML
100 INJECTION, SOLUTION INTRAVASCULAR ONCE
Status: COMPLETED | OUTPATIENT
Start: 2023-03-09 | End: 2023-03-09

## 2023-03-09 RX ADMIN — IOPAMIDOL 100 ML: 755 INJECTION, SOLUTION INTRAVENOUS at 19:25

## 2023-03-14 ENCOUNTER — TRANSFERRED RECORDS (OUTPATIENT)
Dept: HEALTH INFORMATION MANAGEMENT | Facility: CLINIC | Age: 48
End: 2023-03-14
Payer: COMMERCIAL

## 2023-03-29 ENCOUNTER — TRANSFERRED RECORDS (OUTPATIENT)
Dept: HEALTH INFORMATION MANAGEMENT | Facility: CLINIC | Age: 48
End: 2023-03-29
Payer: COMMERCIAL

## 2023-05-19 ENCOUNTER — TRANSFERRED RECORDS (OUTPATIENT)
Dept: HEALTH INFORMATION MANAGEMENT | Facility: CLINIC | Age: 48
End: 2023-05-19
Payer: COMMERCIAL

## 2023-05-19 LAB
ALT SERPL-CCNC: 41 IU/L (ref 0–44)
AST SERPL-CCNC: 29 IU/L (ref 0–40)

## 2023-06-01 DIAGNOSIS — K21.9 GASTROESOPHAGEAL REFLUX DISEASE, UNSPECIFIED WHETHER ESOPHAGITIS PRESENT: ICD-10-CM

## 2023-06-02 NOTE — TELEPHONE ENCOUNTER
Prescription approved per [facility] Refill Protocol.    MARLIN CastañedaN, RN  [facility]

## 2023-06-29 ENCOUNTER — VIRTUAL VISIT (OUTPATIENT)
Dept: FAMILY MEDICINE | Facility: CLINIC | Age: 48
End: 2023-06-29
Payer: COMMERCIAL

## 2023-06-29 DIAGNOSIS — M62.830 BACK MUSCLE SPASM: Primary | ICD-10-CM

## 2023-06-29 PROCEDURE — 99213 OFFICE O/P EST LOW 20 MIN: CPT | Mod: VID | Performed by: INTERNAL MEDICINE

## 2023-06-29 RX ORDER — LIDOCAINE 4 G/G
1 PATCH TOPICAL EVERY 24 HOURS
Qty: 30 PATCH | Refills: 3 | Status: SHIPPED | OUTPATIENT
Start: 2023-06-29

## 2023-06-29 RX ORDER — METHYLPREDNISOLONE 4 MG
TABLET, DOSE PACK ORAL
Qty: 21 TABLET | Refills: 1 | Status: SHIPPED | OUTPATIENT
Start: 2023-06-29 | End: 2024-08-26

## 2023-06-29 RX ORDER — CYCLOBENZAPRINE HCL 5 MG
5 TABLET ORAL 2 TIMES DAILY PRN
Qty: 60 TABLET | Refills: 2 | Status: SHIPPED | OUTPATIENT
Start: 2023-06-29 | End: 2024-08-26

## 2023-06-29 ASSESSMENT — PATIENT HEALTH QUESTIONNAIRE - PHQ9
10. IF YOU CHECKED OFF ANY PROBLEMS, HOW DIFFICULT HAVE THESE PROBLEMS MADE IT FOR YOU TO DO YOUR WORK, TAKE CARE OF THINGS AT HOME, OR GET ALONG WITH OTHER PEOPLE: NOT DIFFICULT AT ALL
SUM OF ALL RESPONSES TO PHQ QUESTIONS 1-9: 0
SUM OF ALL RESPONSES TO PHQ QUESTIONS 1-9: 0

## 2023-06-29 NOTE — PROGRESS NOTES
Valentin is a 48 year old who is being evaluated via a billable video visit.      How would you like to obtain your AVS? MyChart  If the video visit is dropped, the invitation should be resent by: Text to cell phone: [phone]  Will anyone else be joining your video visit? No      Subjective   Valentin is a 48 year old, presenting for the following health issues:  Musculoskeletal Problem (Back spasms right back starts in shoulder and works down)      Musculoskeletal Problem  This is a recurrent problem. The current episode started 1 to 4 weeks ago. The problem occurs constantly. The problem has been gradually worsening. The symptoms are aggravated by bending. He has tried NSAIDs and ice for the symptoms. The treatment provided mild relief.   History of Present Illness       Back Pain:  He presents for follow up of back pain. Patient's back pain is a recurring problem.  Location of back pain:  Right upper back, right side of neck and right shoulder  Description of back pain: burning  Back pain spreads: right shoulder and right side of neck    Since patient first noticed back pain, pain is: always present, but gets better and worse  Does back pain interfere with his job:  Yes      He eats 2-3 servings of fruits and vegetables daily.He consumes 1 sweetened beverage(s) daily.He exercises with enough effort to increase his heart rate 30 to 60 minutes per day.  He exercises with enough effort to increase his heart rate 5 days per week.   He is taking medications regularly.    Today's PHQ-9         PHQ-9 Total Score: 0    PHQ-9 Q9 Thoughts of better off dead/self-harm past 2 weeks :   Not at all    How difficult have these problems made it for you to do your work, take care of things at home, or get along with other people: Not difficult at all         Current Medications:     Current Outpatient Medications   Medication Sig Dispense Refill     albuterol (PROAIR HFA/PROVENTIL HFA/VENTOLIN HFA) 108 (90 Base) MCG/ACT inhaler  Inhale 1-2 puffs into the lungs every 6 hours as needed for shortness of breath, wheezing or cough 18 g 0     cetirizine HCl (ZYRTEC ALLERGY) 10 MG CAPS        cyclobenzaprine (FLEXERIL) 5 MG tablet Take 1 tablet (5 mg) by mouth 2 times daily as needed for muscle spasms 60 tablet 2     inFLIXimab (REMICADE) 100 MG injection Inject 5 mg/kg into the vein       Lidocaine (LIDOCARE) 4 % Patch Place 1 patch onto the skin every 24 hours 30 patch 3     methylPREDNISolone (MEDROL DOSEPAK) 4 MG tablet therapy pack Follow Package Directions 21 tablet 1     omeprazole (PRILOSEC) 20 MG DR capsule TAKE 1 CAPSULE BY MOUTH TWO TIMES A DAY BEFORE MEALS 60 capsule 4     sildenafil (VIAGRA) 50 MG tablet TAKE 1 TABLET BY MOUTH ONCE DAILY AS NEEDED 30 tablet 3         Allergies:    No Known Allergies         Past Medical History:     Past Medical History:   Diagnosis Date     Bursitis     left knee     Chronic ulcerative colitis (H)      Ulcerative colitis (H)     on remicade infusion every 6 nweeks         Past Surgical History:     Past Surgical History:   Procedure Laterality Date     ------------------------------       HERNIA REPAIR       HIP SURGERY Bilateral     Labral Tear Repair     NONE OF THE ABOVE CORE MEASURE DIAGNOSES       OTHER SURGICAL HISTORY Left 05/17/2019    BursectomyKnee     SHOULDER SURGERY Right     RCR     ZZHC REPAIR UMBILICAL GHISLAINE,<4Y/O,REDUC      Description: Umbilical Hernia Repair;  Recorded: 11/05/2013;         Family Medical History:     Family History   Problem Relation Age of Onset     No Known Problems Mother      No Known Problems Father          Social History:     Social History     Socioeconomic History     Marital status:      Spouse name: Not on file     Number of children: Not on file     Years of education: Not on file     Highest education level: Not on file   Occupational History     Not on file   Tobacco Use     Smoking status: Former     Types: Cigarettes     Quit date: 7/27/2003      Years since quittin.9     Smokeless tobacco: Never   Substance and Sexual Activity     Alcohol use: No     Drug use: No     Sexual activity: Not on file   Other Topics Concern     Parent/sibling w/ CABG, MI or angioplasty before 65F 55M? No   Social History Narrative     Not on file     Social Determinants of Health     Financial Resource Strain: Not on file   Food Insecurity: Not on file   Transportation Needs: Not on file   Physical Activity: Not on file   Stress: Not on file   Social Connections: Not on file   Intimate Partner Violence: Not on file   Housing Stability: Not on file           Review of System:     Constitutional: Negative for fever or chills  Skin: Negative for rashes  Ears/Nose/Throat: Negative for nasal congestion, sore throat  Respiratory: No shortness of breath, dyspnea on exertion, cough, or hemoptysis  Cardiovascular: Negative for chest pain  Gastrointestinal: Negative for nausea, vomiting  Genitourinary: negative for dysuria, hematuria  Musculoskeletal: positive for back muscle spasms  Neurologic: Negative for headaches  Psychiatric: Negative for depression, anxiety  Hematologic/Lymphatic/Immunologic: Negative  Endocrine: Negative  Behavioral: Negative for tobacco use       Physical Exam:   There were no vitals taken for this visit.    GENERAL: alert and no distress  EYES: eyes grossly normal to inspection  HENT: Normocephalic atraumatic.   NECK: supple  RESP: no cough  present  CV: patient appears to be hemodynamically stable  MS: back muscle spasms noted  SKIN: no visible suspicious lesions or rashes  NEURO: Alert & Oriented x 3.   PSYCH: mentation appears normal, affect normal        Diagnostic Test Results:     Diagnostic Test Results:  Labs reviewed in Epic    ASSESSMENT/PLAN:       Edwin was seen today for musculoskeletal problem.    Diagnoses and all orders for this visit:    Back muscle spasm  -     methylPREDNISolone (MEDROL DOSEPAK) 4 MG tablet therapy pack; Follow  Package Directions  -     cyclobenzaprine (FLEXERIL) 5 MG tablet; Take 1 tablet (5 mg) by mouth 2 times daily as needed for muscle spasms  -     Lidocaine (LIDOCARE) 4 % Patch; Place 1 patch onto the skin every 24 hours            Follow Up Plan:     Patient is instructed to return to Internal Medicine clinic for follow-up visit in 1 week.        Cary Casas MD  Internal Medicine  [facility]        Video-Visit Details    Type of service:  Video Visit     Originating Location (pt. Location): Home  Distant Location (provider location):  On-site  Platform used for Video Visit: Mykel

## 2023-07-06 ENCOUNTER — VIRTUAL VISIT (OUTPATIENT)
Dept: FAMILY MEDICINE | Facility: CLINIC | Age: 48
End: 2023-07-06
Payer: COMMERCIAL

## 2023-07-06 DIAGNOSIS — M54.2 NECK PAIN: ICD-10-CM

## 2023-07-06 DIAGNOSIS — S46.811A STRAIN OF TRAPEZIUS MUSCLE, RIGHT, INITIAL ENCOUNTER: Primary | ICD-10-CM

## 2023-07-06 PROCEDURE — 99214 OFFICE O/P EST MOD 30 MIN: CPT | Mod: VID

## 2023-07-06 RX ORDER — NAPROXEN 500 MG/1
500 TABLET ORAL 2 TIMES DAILY WITH MEALS
Qty: 60 TABLET | Refills: 0 | Status: SHIPPED | OUTPATIENT
Start: 2023-07-06 | End: 2024-08-26

## 2023-07-06 ASSESSMENT — ENCOUNTER SYMPTOMS: BACK PAIN: 1

## 2023-07-06 NOTE — PROGRESS NOTES
"Valentin is a 48 year old who is being evaluated via a billable video visit.      How would you like to obtain your AVS? MyChart  If the video visit is dropped, the invitation should be resent by: Text to cell phone: [phone]  Will anyone else be joining your video visit? No      Assessment & Plan     Strain of trapezius muscle, right, initial encounter  Acute, symptoms in upper back/neck and trapezius. Advised alternating tylenol and naproxen, schedule physical therapy. Offered referral to spin  to discuss injection options, as this helped similar pain in left back/neck prior, patient declines today. Happy to send referral if patient changes his mind and wants referral. Advised trial of gabapentin for nerve pain, patient declined today. Happy to send short course if pain is not improved at all with stretches, tylenol/ibuprofen.  - Physical Therapy Referral  - naproxen (NAPROSYN) 500 MG tablet  Dispense: 60 tablet; Refill: 0    Neck pain  Acute, unclear etiology. No trauma. DDx includes cervical DDD, herniated disc, shoulder impingement, trapezius or paraspinal muscle strain. Discussed chronic/recurrent nature of neck and back pain. Advised PT and alternating tylenol/ibuprofen since reports muscle relaxer is not helping.   - Physical Therapy Referral  - naproxen (NAPROSYN) 500 MG tablet  Dispense: 60 tablet; Refill: 0      Ordering of each unique test  Prescription drug management         BMI:   Estimated body mass index is 28.89 kg/m  as calculated from the following:    Height as of 9/27/22: 1.854 m (6' 1\").    Weight as of 9/27/22: 99.3 kg (219 lb).       See Patient Instructions  - alt tylenol and naproxen  - increase omeprazole to avoid ulcer/GERD flare  - schedule PT  - [employer] message to request gabapentin and spine referral if changes his mind in the next month.     ABHAY [facility]    Subjective   Valentin is a 48 year old, presenting for the following " health issues:  Back Pain and Shoulder Pain        7/6/2023     4:09 PM   Additional Questions   Roomed by Myles HAUSER MA     HPI     Standing and laying down is okay  Sitting is worse    Right side is weaker than left side.   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   Reports muscle relaxer has not helped at all.     Pain History:  When did you first notice your pain? 3-4 weeks, worse in the last 2 weeks. Pain isn't improved at all. Feeling like strength into right arm is     Have you seen anyone else for your pain? Yes - Virtual visit   How has your pain affected your ability to work? Pain does not limit ability to work, pt just working slower  What type of work do you or did you do?   Where in your body do you have pain? Back Pain  Onset/Duration: 3-4 weeks  Description:   Location of pain: upper back right, right shoulder blade burning, constantly.  Character of pain: burning  Pain radiation: radiates into the right buttocks  New numbness or weakness in legs, not attributed to pain: no   Intensity: Currently 6-7/10 last week  Progression of Symptoms: same  History:   Specific cause: unknown Pain interferes with job: No  History of back problems: has been seen for back years ago  Any previous MRI or X-rays: CT scan for abdomen recently due to colitis, showed some arthritis in the back  Sees a specialist for back pain: Not currently - saw spine specialist in the past for left sided neck/back pain, had injection and PT which helped.  Alleviating factors:   Improved by: ibuprofen (6:30 AM), aleve (noon), ibuprofen before bed.  Ice packs help temporarily.    Precipitating factors:  Worsened by: Sitting  Therapies tried and outcome: methylpredisone pack, ibuprofen, cyclobenzaprine, did not want to take pain medications.     Accompanying Signs & Symptoms:  Risk of Fracture: None  Risk of Cauda Equina: None  Risk of Infection: None  Risk of Cancer: None  Risk of Ankylosing Spondylitis:  Onset at age <35, male, AND morning back stiffness No                Normally sees Dr. Zambrano at [place].       Review of Systems   Constitutional, HEENT, cardiovascular, pulmonary, gi and gu systems are negative, except as otherwise noted.      Objective           Vitals:  No vitals were obtained today due to virtual visit.    Physical Exam   GENERAL: Healthy, alert and no distress  EYES: Eyes grossly normal to inspection.  No discharge or erythema, or obvious scleral/conjunctival abnormalities.  RESP: No audible wheeze, cough, or visible cyanosis.  No visible retractions or increased work of breathing.    SKIN: Visible skin clear. No significant rash, abnormal pigmentation or lesions.  NEURO: Cranial nerves grossly intact.  Mentation and speech appropriate for age.  PSYCH: Mentation appears normal, affect normal/bright, judgement and insight intact, normal speech and appearance well-groomed.    No results found.            Video-Visit Details    Type of service:  Video Visit     Originating Location (pt. Location): Home  Distant Location (provider location):  On-site  Platform used for Video Visit: Addy

## 2023-07-06 NOTE — PATIENT INSTRUCTIONS
Schedule physical therapy   Tylenol 1000 mg 3 times/day  Naproxen 500 mg tablets twice daily    Increase omeprazole (prilosec) to DAILY 30-60 minutes before on empty stomach. Increase to to 20 mg in AM, plus 20 mg 30 minutes before dinner.     Don't drink any alcohol while taking consistent doses of tylenol and/or naproxen    Do not take any additional over the counter pain medications  (Motrin, advil, aspirin etc).     Send my a [employer] message if this pain regimen and handouts with stretches/exercises does not help your symptoms in the next 48-72 hours and I can send a prescription to try GABAPENTIN (pain medication for neuropathy).     Send me a [facility] message if you decide you would like a referral to the spine clinic before you see your Primary provider  early august.     Kayden

## 2023-07-14 ENCOUNTER — MYC MEDICAL ADVICE (OUTPATIENT)
Dept: FAMILY MEDICINE | Facility: CLINIC | Age: 48
End: 2023-07-14
Payer: COMMERCIAL

## 2023-07-14 DIAGNOSIS — Z63.0 MARITAL CONFLICT: Primary | ICD-10-CM

## 2023-07-14 SDOH — SOCIAL STABILITY - SOCIAL INSECURITY: PROBLEMS IN RELATIONSHIP WITH SPOUSE OR PARTNER: Z63.0

## 2023-07-27 DIAGNOSIS — N52.9 ERECTILE DYSFUNCTION, UNSPECIFIED ERECTILE DYSFUNCTION TYPE: ICD-10-CM

## 2023-07-28 RX ORDER — SILDENAFIL 50 MG/1
TABLET, FILM COATED ORAL
Qty: 90 TABLET | Refills: 0 | Status: SHIPPED | OUTPATIENT
Start: 2023-07-28 | End: 2024-01-19

## 2023-07-28 NOTE — TELEPHONE ENCOUNTER
"Last Written Prescription Date:  3/1/2023  Last Fill Quantity: 30,  # refills: 3   Last office visit provider:  9/27/2022   Future visit 8/7/2023  Requested Prescriptions   Pending Prescriptions Disp Refills    sildenafil (VIAGRA) 50 MG tablet [Pharmacy Med Name: SILDENAFIL CITRATE 50MG TABS] 30 tablet 3     Sig: TAKE ONE TABLET BY MOUTH EVERY DAY AS NEEDED       Erectile Dysfuction Protocol Passed - 7/28/2023 10:42 AM        Passed - Absence of nitrates on medication list        Passed - Absence of Alpha Blockers on Med list        Passed - Recent (12 mo) or future (30 days) visit within the authorizing provider's specialty     Patient has had an office visit with the authorizing provider or a provider within the authorizing providers department within the previous 12 mos or has a future within next 30 days. See \"Patient Info\" tab in inbasket, or \"Choose Columns\" in Meds & Orders section of the refill encounter.              Passed - Medication is active on med list        Passed - Patient is age 18 or older             Janet Reece RN 07/28/23 10:42 AM  "

## 2023-08-25 ENCOUNTER — TRANSFERRED RECORDS (OUTPATIENT)
Dept: HEALTH INFORMATION MANAGEMENT | Facility: CLINIC | Age: 48
End: 2023-08-25
Payer: COMMERCIAL

## 2023-10-02 ENCOUNTER — TRANSFERRED RECORDS (OUTPATIENT)
Dept: HEALTH INFORMATION MANAGEMENT | Facility: CLINIC | Age: 48
End: 2023-10-02
Payer: COMMERCIAL

## 2023-10-19 ENCOUNTER — TRANSFERRED RECORDS (OUTPATIENT)
Dept: HEALTH INFORMATION MANAGEMENT | Facility: CLINIC | Age: 48
End: 2023-10-19
Payer: COMMERCIAL

## 2023-10-19 LAB
ALT SERPL-CCNC: 23 IU/L (ref 0–44)
AST SERPL-CCNC: 22 IU/L (ref 0–40)

## 2023-11-11 ENCOUNTER — HEALTH MAINTENANCE LETTER (OUTPATIENT)
Age: 48
End: 2023-11-11

## 2023-12-15 ENCOUNTER — TRANSFERRED RECORDS (OUTPATIENT)
Dept: HEALTH INFORMATION MANAGEMENT | Facility: CLINIC | Age: 48
End: 2023-12-15
Payer: COMMERCIAL

## 2023-12-17 ENCOUNTER — OFFICE VISIT (OUTPATIENT)
Dept: FAMILY MEDICINE | Facility: CLINIC | Age: 48
End: 2023-12-17
Payer: COMMERCIAL

## 2023-12-17 VITALS
SYSTOLIC BLOOD PRESSURE: 120 MMHG | WEIGHT: 227.1 LBS | HEART RATE: 97 BPM | TEMPERATURE: 98.3 F | RESPIRATION RATE: 16 BRPM | OXYGEN SATURATION: 97 % | DIASTOLIC BLOOD PRESSURE: 82 MMHG | BODY MASS INDEX: 29.96 KG/M2

## 2023-12-17 DIAGNOSIS — J01.90 ACUTE BACTERIAL RHINOSINUSITIS: Primary | ICD-10-CM

## 2023-12-17 DIAGNOSIS — B96.89 ACUTE BACTERIAL RHINOSINUSITIS: Primary | ICD-10-CM

## 2023-12-17 PROCEDURE — 99213 OFFICE O/P EST LOW 20 MIN: CPT

## 2023-12-17 RX ORDER — FLUTICASONE PROPIONATE 50 MCG
1 SPRAY, SUSPENSION (ML) NASAL DAILY
Qty: 15.8 ML | Refills: 1 | Status: SHIPPED | OUTPATIENT
Start: 2023-12-17 | End: 2024-08-26

## 2023-12-17 RX ORDER — AMOXICILLIN 875 MG
875 TABLET ORAL 2 TIMES DAILY
Qty: 10 TABLET | Refills: 0 | Status: SHIPPED | OUTPATIENT
Start: 2023-12-17 | End: 2023-12-22

## 2023-12-18 NOTE — PATIENT INSTRUCTIONS
You may have a bacterial sinus infection. Since you have had that sinus pressure for over a week, we can try an antibiotic. It's twice a day for 5 days.   Humidified air + mucinex (guaifenesin) for the cough, it will loosen up the mucus so you can absorb it or cough it out rather than having it sit in your respiratory passages and cause irritation.

## 2023-12-18 NOTE — PROGRESS NOTES
Assessment & Plan       ICD-10-CM    1. Acute bacterial rhinosinusitis  J01.90 fluticasone (FLONASE) 50 MCG/ACT nasal spray    B96.89 amoxicillin (AMOXIL) 875 MG tablet            Valentin has had sinus pressure for weeks now and doesn't feel like it's getting better. Discussed that he may have a bacterial sinus infection. It's not super clear - he may just have nasal congestion or some sinus congestion that is not yet a bacterial infection, but given the duration of symptoms we will try amoxicillin - he is also somewhat immunosuppressed due to remicade for ulcerative colitis which could impact his ability to clear an infection. We will just do 5 days. If it's getting worse or not getting better 5 days from now, could consider doing 7 days or trying augmentin (though low concern for resistance given he does not have recurrent infections requiring abx). As far as the possibility of a viral illness triggering these symptoms, he is several weeks in and not having fevers, so testing for flu, covid and RSV would not . I also recommended trying flonase for the congestion and advised against afrin-type products unless using for only a couple days. Lung sounds were clear and equal, so low suspicion for pna causing a chronic cough. Non-smoker.       Follow up with primary care provider with any problems, questions or concerns or if symptoms worsen or fail to improve. Patient agreed to plan and verbalized understanding.     Subjective     Valentin is a 48 year old male who presents to clinic today for the following health issues:  Chief Complaint   Patient presents with    Sick     Pt states he was sick right after thanksgiving and got better then last week started not feeling well again: has a cough, achy, nasal congestion as well and has not tested for Covid      HPI    Started feeling sick around Thanksgiving, got better and then started feeling worse again last Sunday, so a week ago. No fevers, just a dry cough.  Neon green boogers in the morning. Partner wanted him to be seen. Feels pressure in the sinuses but it doesn't always come out. Maybe some body aches. Has been taking OTC cold medicine. Non smoker. No sinus tenderness but does have a headache. It's been three weeks of feeling pressure in the maxillary sinuses. Not hard to breathe or hard to get a deep breath, mostly just the cough. Has not had recurrent sinus infections needing abx treatment. No ear symptoms.         Review of Systems  10 point ROS otherwise negative.       Problem List:  2021: Ulcerative colitis (H)  2021: Prepatellar bursitis, left knee  2021: Dyslipidemia  2021: Disorder of bursae and tendons in shoulder region  2021: Cervicalgia  2021: Cellulitis of left knee  2021: Anemia  2021: Disorder of stomach  2021: Obstruction of esophagus  2020: Diarrhea  2019: Septic bursitis  2019: Knee pain, left  2017: Labral tear of hip, degenerative  2012-10: Ulcerative chronic pancolitis (H)      Past Medical History:   Diagnosis Date    Bursitis     left knee    Chronic ulcerative colitis (H)     Ulcerative colitis (H)     on remicade infusion every 6 nweeks       Social History     Tobacco Use    Smoking status: Former     Types: Cigarettes     Quit date: 2003     Years since quittin.4    Smokeless tobacco: Never   Substance Use Topics    Alcohol use: No           Objective    /82   Pulse 97   Temp 98.3  F (36.8  C) (Oral)   Resp 16   Wt 103 kg (227 lb 1.6 oz)   SpO2 97%   BMI 29.96 kg/m    Physical Exam  Constitutional:       General: He is not in acute distress.     Appearance: Normal appearance. He is not ill-appearing.   HENT:      Head: Normocephalic and atraumatic.      Right Ear: External ear normal.      Left Ear: External ear normal.      Nose: Nose normal.   Eyes:      General: No scleral icterus.     Extraocular Movements: Extraocular movements intact.      Conjunctiva/sclera: Conjunctivae  normal.      Pupils: Pupils are equal, round, and reactive to light.   Cardiovascular:      Rate and Rhythm: Normal rate and regular rhythm.   Pulmonary:      Effort: Pulmonary effort is normal. No respiratory distress.      Breath sounds: Normal breath sounds. No wheezing or rhonchi.   Abdominal:      General: Abdomen is flat.   Musculoskeletal:         General: Normal range of motion.      Cervical back: Normal range of motion and neck supple.   Skin:     General: Skin is warm and dry.      Coloration: Skin is not jaundiced.   Neurological:      General: No focal deficit present.      Mental Status: He is alert. Mental status is at baseline.   Psychiatric:         Mood and Affect: Mood normal.         Behavior: Behavior normal.         Thought Content: Thought content normal.          Marjorie Medina MD

## 2024-01-18 DIAGNOSIS — N52.9 ERECTILE DYSFUNCTION, UNSPECIFIED ERECTILE DYSFUNCTION TYPE: ICD-10-CM

## 2024-01-19 RX ORDER — SILDENAFIL 50 MG/1
TABLET, FILM COATED ORAL
Qty: 90 TABLET | Refills: 0 | Status: SHIPPED | OUTPATIENT
Start: 2024-01-19 | End: 2024-07-19

## 2024-02-12 ENCOUNTER — TRANSFERRED RECORDS (OUTPATIENT)
Dept: HEALTH INFORMATION MANAGEMENT | Facility: CLINIC | Age: 49
End: 2024-02-12
Payer: COMMERCIAL

## 2024-03-18 ENCOUNTER — TRANSFERRED RECORDS (OUTPATIENT)
Dept: HEALTH INFORMATION MANAGEMENT | Facility: CLINIC | Age: 49
End: 2024-03-18
Payer: COMMERCIAL

## 2024-03-18 LAB
ALT SERPL-CCNC: 31 IU/L (ref 0–44)
AST SERPL-CCNC: 27 IU/L (ref 0–4)
CREATININE (EXTERNAL): 0.97 MG/DL (ref 0.76–1.27)
GFR ESTIMATED (EXTERNAL): 98 ML/MIN/1.73
GLUCOSE (EXTERNAL): 92 MG/DL (ref 70–99)
POTASSIUM (EXTERNAL): 4.1 MMOL/L (ref 3.5–5.2)

## 2024-03-26 ENCOUNTER — VIRTUAL VISIT (OUTPATIENT)
Dept: INTERNAL MEDICINE | Facility: CLINIC | Age: 49
End: 2024-03-26
Payer: COMMERCIAL

## 2024-03-26 ENCOUNTER — LAB (OUTPATIENT)
Dept: FAMILY MEDICINE | Facility: CLINIC | Age: 49
End: 2024-03-26
Attending: NURSE PRACTITIONER
Payer: COMMERCIAL

## 2024-03-26 DIAGNOSIS — R05.9 COUGH, UNSPECIFIED TYPE: Primary | ICD-10-CM

## 2024-03-26 DIAGNOSIS — R05.9 COUGH, UNSPECIFIED TYPE: ICD-10-CM

## 2024-03-26 LAB
FLUAV RNA SPEC QL NAA+PROBE: NEGATIVE
FLUBV RNA RESP QL NAA+PROBE: POSITIVE
RSV RNA SPEC NAA+PROBE: NEGATIVE
SARS-COV-2 RNA RESP QL NAA+PROBE: NEGATIVE

## 2024-03-26 PROCEDURE — 87637 SARSCOV2&INF A&B&RSV AMP PRB: CPT

## 2024-03-26 PROCEDURE — 99441 PR PHYSICIAN TELEPHONE EVALUATION 5-10 MIN: CPT | Performed by: NURSE PRACTITIONER

## 2024-03-26 PROCEDURE — 99207 PR NO CHARGE LOS: CPT

## 2024-03-26 ASSESSMENT — PATIENT HEALTH QUESTIONNAIRE - PHQ9
SUM OF ALL RESPONSES TO PHQ QUESTIONS 1-9: 0
SUM OF ALL RESPONSES TO PHQ QUESTIONS 1-9: 0

## 2024-03-26 ASSESSMENT — ENCOUNTER SYMPTOMS: COUGH: 1

## 2024-03-26 NOTE — PROGRESS NOTES
Valentin is a 48 year old who is being evaluated via a billable video visit.    How would you like to obtain your AVS? [employer]  If the video visit is dropped, the invitation should be resent by: Other e-mail: [employer]  Will anyone else be joining your video visit? No      Assessment & Plan     10 minutes were spent on our telephone visit today.  We had technical difficulties completing the video visit.      Cough, unspecified type  He has tested positive for influenza B.  I relayed those results to him via [employer] along with supportive measures.    If he develops any significant problems breathing, he should be evaluated right away.  Fortunately, it sounds like his symptoms are improving    - Symptomatic Influenza A/B, RSV, & SARS-CoV2 PCR (COVID-19); Future                  Subjective   Valentin is a 48 year old, presenting for the following health issues:  Cough and Nasal Congestion      3/26/2024    12:33 PM   Additional Questions   Roomed by GERALDINE RODRIGUEZ     Cough    History of Present Illness       Reason for visit:  Nose running, cough, congestion, feel horrible.  Symptom onset:  3-7 days ago  Symptoms include:  See above   Symptom intensity:  Severe  Symptom progression:  Improving  Had these symptoms before:  No    He eats 2-3 servings of fruits and vegetables daily.He consumes 1 sweetened beverage(s) daily.He exercises with enough effort to increase his heart rate 30 to 60 minutes per day.  He exercises with enough effort to increase his heart rate 5 days per week.   He is taking medications regularly.                   Objective           Vitals:  No vitals were obtained today due to virtual visit.    Physical Exam   GENERAL: alert and no distress  RESP: No audible wheeze, cough, or visible cyanosis.    PSYCH: Appropriate affect, tone, and pace of words          Video-Visit Details    Type of service:  Video Visit   Originating Location (pt. Location): Home    Distant Location (provider location):  On-site  Platform  used for Video Visit: Unable to complete video visit  Signed Electronically by: Edward Orantes, CNP

## 2024-03-29 ENCOUNTER — VIRTUAL VISIT (OUTPATIENT)
Dept: FAMILY MEDICINE | Facility: CLINIC | Age: 49
End: 2024-03-29
Payer: COMMERCIAL

## 2024-03-29 DIAGNOSIS — R21 RASH: Primary | ICD-10-CM

## 2024-03-29 PROCEDURE — 99213 OFFICE O/P EST LOW 20 MIN: CPT | Mod: 95 | Performed by: PHYSICIAN ASSISTANT

## 2024-03-29 RX ORDER — VALACYCLOVIR HYDROCHLORIDE 1 G/1
1000 TABLET, FILM COATED ORAL 3 TIMES DAILY
Qty: 21 TABLET | Refills: 0 | Status: SHIPPED | OUTPATIENT
Start: 2024-03-29 | End: 2024-08-26

## 2024-03-29 NOTE — PROGRESS NOTES
Valentin is a 48 year old who is being evaluated via a billable video visit.    How would you like to obtain your AVS? MyChart  If the video visit is dropped, the invitation should be resent by: Text to cell phone: [phone]  Will anyone else be joining your video visit? No      Assessment & Plan     Rash  New problem, highly suspicious for shingles though very hard to tell on video visit. Discussed cause and risks of shingles, will treat with Valtrex.  Discussed if rash worsens, he develops fever, or rash crosses the midline needs in person evaluation.  Risks, benefits and alternatives were discussed with patient. Agreeable to the plan of care.  - valACYclovir (VALTREX) 1000 mg tablet  Dispense: 21 tablet; Refill: 0            Subjective   Valentin is a 48 year old, presenting for the following health issues:  Rash (Has a small spot on right buttock showed up yesterday.  3 spots with in the area of a dollar bill.  No blisters.  Waistline Hip has been very painful for 3 weeks.  Thigh was tingling.  Weird pain, all way there radiating and shooting.  )      3/29/2024     1:17 PM   Additional Questions   Roomed by ERIKA Dillard CMA([facility])     HPI       Patient seen today via video visit for evaluation of a rash  Ongoing for 2 days, started yesterday but had thigh tingling on same side for last 2 weeks  Rash is described as red, somewhat bumpy, no drainage  No fever  Did have chicken pox as child  Rash does not cross midline  Is taking Tylenol for pain relief      Review of Systems  Constitutional, HEENT, cardiovascular, pulmonary, gi and gu systems are negative, except as otherwise noted.      Objective           Vitals:  No vitals were obtained today due to virtual visit.    Physical Exam   GENERAL: alert and no distress  EYES: Eyes grossly normal to inspection.  No discharge or erythema, or obvious scleral/conjunctival abnormalities.  RESP: No audible wheeze, cough, or visible cyanosis.    SKIN: right posterior upper buttock  there is red, pinpoint rash that is somewhat raised (difficult to see on video visit)  NEURO: Cranial nerves grossly intact.  Mentation and speech appropriate for age.  PSYCH: Appropriate affect, tone, and pace of words        Video-Visit Details    Type of service:  Video Visit   Originating Location (pt. Location): Home    Distant Location (provider location):  Off-site  Platform used for Video Visit: Addy  Signed Electronically by: Anu Govea PA-C

## 2024-04-05 ENCOUNTER — TRANSFERRED RECORDS (OUTPATIENT)
Dept: HEALTH INFORMATION MANAGEMENT | Facility: CLINIC | Age: 49
End: 2024-04-05
Payer: COMMERCIAL

## 2024-04-05 LAB
ALT SERPL-CCNC: 33 IU/L (ref 0–44)
AST SERPL-CCNC: 23 IU/L (ref 0–40)

## 2024-05-31 ENCOUNTER — TRANSFERRED RECORDS (OUTPATIENT)
Dept: HEALTH INFORMATION MANAGEMENT | Facility: CLINIC | Age: 49
End: 2024-05-31
Payer: COMMERCIAL

## 2024-07-18 DIAGNOSIS — N52.9 ERECTILE DYSFUNCTION, UNSPECIFIED ERECTILE DYSFUNCTION TYPE: ICD-10-CM

## 2024-07-19 ENCOUNTER — TRANSFERRED RECORDS (OUTPATIENT)
Dept: HEALTH INFORMATION MANAGEMENT | Facility: CLINIC | Age: 49
End: 2024-07-19
Payer: COMMERCIAL

## 2024-07-19 RX ORDER — SILDENAFIL 50 MG/1
TABLET, FILM COATED ORAL
Qty: 90 TABLET | Refills: 0 | Status: SHIPPED | OUTPATIENT
Start: 2024-07-19

## 2024-08-26 ENCOUNTER — ANCILLARY PROCEDURE (OUTPATIENT)
Dept: GENERAL RADIOLOGY | Facility: CLINIC | Age: 49
End: 2024-08-26
Attending: FAMILY MEDICINE
Payer: COMMERCIAL

## 2024-08-26 ENCOUNTER — OFFICE VISIT (OUTPATIENT)
Dept: FAMILY MEDICINE | Facility: CLINIC | Age: 49
End: 2024-08-26
Payer: COMMERCIAL

## 2024-08-26 VITALS
HEIGHT: 73 IN | BODY MASS INDEX: 28.89 KG/M2 | SYSTOLIC BLOOD PRESSURE: 130 MMHG | DIASTOLIC BLOOD PRESSURE: 74 MMHG | OXYGEN SATURATION: 98 % | RESPIRATION RATE: 18 BRPM | TEMPERATURE: 98 F | HEART RATE: 75 BPM | WEIGHT: 218 LBS

## 2024-08-26 DIAGNOSIS — M25.551 HIP PAIN, RIGHT: Primary | ICD-10-CM

## 2024-08-26 DIAGNOSIS — M25.551 HIP PAIN, RIGHT: ICD-10-CM

## 2024-08-26 PROCEDURE — 99214 OFFICE O/P EST MOD 30 MIN: CPT | Performed by: FAMILY MEDICINE

## 2024-08-26 PROCEDURE — 73502 X-RAY EXAM HIP UNI 2-3 VIEWS: CPT | Mod: TC | Performed by: RADIOLOGY

## 2024-08-26 ASSESSMENT — PAIN SCALES - GENERAL: PAINLEVEL: MODERATE PAIN (5)

## 2024-08-26 NOTE — PATIENT INSTRUCTIONS
Call orthopedic specialist to schedule appointment to evaluate hip pain:  [facility]. [phone]

## 2024-08-26 NOTE — PROGRESS NOTES
"Edwin Becker  /74   Pulse 75   Temp 98  F (36.7  C)   Resp 18   Ht 1.854 m (6' 1\")   Wt 98.9 kg (218 lb)   SpO2 98%   BMI 28.76 kg/m       Assessment/Plan:                Valentin was seen today for hip pain.    Diagnoses and all orders for this visit:    Hip pain, right  -     XR Hip Right 2-3 Views; Future  -     Orthopedic  Referral; Future         DISCUSSION  Plain film of the hip shows that there is no joint space in the superior portion of the joint and again in the inferior portion of the joint it looks like there is minimal joint space present.  Discussed with a complex considerations regarding his hip since his surgery in 2017 that we do have him see orthopedic specialist to evaluate and consider additional potential treatment options for now he will continue acetaminophen and ibuprofen.  Subjective:     HPI:    Edwin Becker is a 49 year old male is here today concerned with hip pain.  Patient reports right hip pain that is chronic and gradually progressive causing more more significant dysfunction.  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  Initially did well but since that time he said gradual progression.  Notes that he cannot do any type of athletic activities even walking at times can be difficult.  Takes acetaminophen and ibuprofen for pain relief with benefit.  Would like to consider other options for treatment.  We discussed obtaining an x-ray today as well as consideration of having him again visit with a specialist.  He denies any neurologic symptoms such as radiation of pain weakness numbness or tingling.  Does report some sensation of weakness in the leg because he is not using the leg is much so his strength is diminished.  The this has been a gradual progression.  ROS:  Complete review of systems is obtained.  Other than the specific considerations noted above complete review of systems " is negative.          Objective:   Medications:  Current Outpatient Medications   Medication Sig Dispense Refill    cetirizine HCl (ZYRTEC ALLERGY) 10 MG CAPS       inFLIXimab (REMICADE) 100 MG injection Inject 5 mg/kg into the vein      Lidocaine (LIDOCARE) 4 % Patch Place 1 patch onto the skin every 24 hours 30 patch 3    omeprazole (PRILOSEC) 20 MG DR capsule TAKE 1 CAPSULE BY MOUTH TWO TIMES A DAY BEFORE MEALS 60 capsule 4    sildenafil (VIAGRA) 50 MG tablet TAKE ONE TABLET BY MOUTH EVERY DAY AS NEEDED 90 tablet 0     No current facility-administered medications for this visit.        Allergies:   No Known Allergies     Social History     Socioeconomic History    Marital status:      Spouse name: Not on file    Number of children: Not on file    Years of education: Not on file    Highest education level: Not on file   Occupational History    Not on file   Tobacco Use    Smoking status: Former     Current packs/day: 0.00     Types: Cigarettes     Quit date: 2003     Years since quittin.0    Smokeless tobacco: Never   Vaping Use    Vaping status: Never Used   Substance and Sexual Activity    Alcohol use: No    Drug use: No    Sexual activity: Not on file   Other Topics Concern    Parent/sibling w/ CABG, MI or angioplasty before 65F 55M? No   Social History Narrative    Not on file     Social Determinants of Health     Financial Resource Strain: Not on file   Food Insecurity: Not on file   Transportation Needs: Not on file   Physical Activity: Not on file   Stress: Not on file   Social Connections: Not on file   Interpersonal Safety: Not on file   Housing Stability: Not on file       Family History   Problem Relation Age of Onset    No Known Problems Mother     No Known Problems Father         Most Recent Immunizations   Administered Date(s) Administered    COVID-19 MONOVALENT 12+ (Pfizer) 2021    Flu, Unspecified 2020    Influenza (IIV3) PF 2013    Influenza Vaccine >6  "months,quad, PF 11/26/2021    Influenza Vaccine, 6+MO IM (QUADRIVALENT W/PRESERVATIVES) 11/05/2019    Pneumo Conj 13-V (2010&after) 12/02/2016    Pneumococcal 23 valent 11/05/2013    TD,PF 7+ (Tenivac) 09/27/2022    TDAP (Adacel,Boostrix) 09/27/2012    TDAP Vaccine (Adacel) 09/27/2012    Tdap (Adult) Unspecified Formulation 02/18/2010        Wt Readings from Last 3 Encounters:   08/26/24 98.9 kg (218 lb)   12/17/23 103 kg (227 lb 1.6 oz)   09/27/22 99.3 kg (219 lb)        BP Readings from Last 6 Encounters:   08/26/24 130/74   12/17/23 120/82   09/27/22 126/76   04/15/22 124/76   11/26/21 124/76   07/02/21 120/72        PHYSICAL EXAM:    /74   Pulse 75   Temp 98  F (36.7  C)   Resp 18   Ht 1.854 m (6' 1\")   Wt 98.9 kg (218 lb)   SpO2 98%   BMI 28.76 kg/m       Examination of his hip elicits no significant tenderness on palpation he has limited range of motion with both internal and external rotation.  No significant strength deficit noted with hip flexion or extension.  He walks with a normal-appearing gait at this point in time.                          "

## 2024-08-27 ENCOUNTER — TRANSFERRED RECORDS (OUTPATIENT)
Dept: HEALTH INFORMATION MANAGEMENT | Facility: CLINIC | Age: 49
End: 2024-08-27
Payer: COMMERCIAL

## 2024-09-10 ENCOUNTER — TRANSFERRED RECORDS (OUTPATIENT)
Dept: HEALTH INFORMATION MANAGEMENT | Facility: CLINIC | Age: 49
End: 2024-09-10
Payer: COMMERCIAL

## 2024-09-16 ENCOUNTER — TRANSFERRED RECORDS (OUTPATIENT)
Dept: HEALTH INFORMATION MANAGEMENT | Facility: CLINIC | Age: 49
End: 2024-09-16
Payer: COMMERCIAL

## 2024-09-16 LAB
ALT SERPL-CCNC: 25 IU/L (ref 0–44)
AST SERPL-CCNC: 21 IU/L (ref 0–40)

## 2024-11-04 ENCOUNTER — TRANSFERRED RECORDS (OUTPATIENT)
Dept: HEALTH INFORMATION MANAGEMENT | Facility: CLINIC | Age: 49
End: 2024-11-04
Payer: COMMERCIAL

## 2024-12-10 ENCOUNTER — TRANSFERRED RECORDS (OUTPATIENT)
Dept: HEALTH INFORMATION MANAGEMENT | Facility: CLINIC | Age: 49
End: 2024-12-10
Payer: COMMERCIAL

## 2024-12-13 ENCOUNTER — TRANSFERRED RECORDS (OUTPATIENT)
Dept: HEALTH INFORMATION MANAGEMENT | Facility: CLINIC | Age: 49
End: 2024-12-13
Payer: COMMERCIAL

## 2024-12-28 ENCOUNTER — HEALTH MAINTENANCE LETTER (OUTPATIENT)
Age: 49
End: 2024-12-28

## 2025-01-06 ENCOUNTER — TRANSFERRED RECORDS (OUTPATIENT)
Dept: HEALTH INFORMATION MANAGEMENT | Facility: CLINIC | Age: 50
End: 2025-01-06
Payer: COMMERCIAL

## 2025-01-17 DIAGNOSIS — K21.9 GASTROESOPHAGEAL REFLUX DISEASE, UNSPECIFIED WHETHER ESOPHAGITIS PRESENT: ICD-10-CM

## 2025-01-29 DIAGNOSIS — N52.9 ERECTILE DYSFUNCTION, UNSPECIFIED ERECTILE DYSFUNCTION TYPE: ICD-10-CM

## 2025-01-30 RX ORDER — SILDENAFIL 50 MG/1
TABLET, FILM COATED ORAL
Qty: 90 TABLET | Refills: 0 | Status: SHIPPED | OUTPATIENT
Start: 2025-01-30

## 2025-02-24 ENCOUNTER — TRANSFERRED RECORDS (OUTPATIENT)
Dept: HEALTH INFORMATION MANAGEMENT | Facility: CLINIC | Age: 50
End: 2025-02-24
Payer: COMMERCIAL

## 2025-04-11 ENCOUNTER — TRANSFERRED RECORDS (OUTPATIENT)
Dept: HEALTH INFORMATION MANAGEMENT | Facility: CLINIC | Age: 50
End: 2025-04-11
Payer: COMMERCIAL

## 2025-04-24 ENCOUNTER — TRANSFERRED RECORDS (OUTPATIENT)
Dept: ADMINISTRATIVE | Facility: CLINIC | Age: 50
End: 2025-04-24
Payer: COMMERCIAL

## 2025-04-25 NOTE — PROGRESS NOTES
_________________________________________________________________________________________________    Patient name: Edwin Becker  YOB: 1975  Encounter date: 04/24/2025 02:45 PM  Current date: 04/24/2025 04:04 PM  Procedure: Infusion      Infusion/Additional Medication Orders    Assessment: Ulcerative chronic pancolitis without complications K51.00     Medication grids  Order Date Medication Dose Route Rate Rate 2 Rate 3 Rate 4 Int. of Treat.   10/24/2024 [facility] 5 mg/kg  mL/hr    7 Weeks   Inf. Date Medication % Conc. Inf. # Therapy Type Bag # Vials Total mgs Lot # Exp. Date   04/24/2025 Avsola  69 maintenance  5 500.00 [id number] 08/31/2028   Inf. Date Medication IV Site IV Gauge Start Stop Total Time Wt. (lbs) Wt. (kgs)   04/24/2025 Avsola left hand 22 2:45 PM 3:45 PM one hour 222.00 100.680     Vitals Flowsheet  Time Temp Pulse Resp Sys BP Amna BP Reaction Conc Rate   2:46 PM 97.1 89 18 135 75      3:15 PM 97.6 76 16 121 69      3:45 PM 97.3 92 18 126 78        Post Infusion  The patient did tolerate the infusion.   The patients disposition on discharge was: good.    Nursing Notes  Order date: 10/24/2024, Avsola Accelerated  Last infusion date: 02/24/2025  Oral premeds per order: No  Specialty Pharmacy: No  Change in health status per assessment? No  IV maintenance 0.9% NS 500ml TKO  Start time: 2:46 PM  IV start by: Michelle Fernandez RN  IV and Fluid D/C time: 4:00 PM  NS volume infused: <50mL  Site condition: CDI and patent throughout infusion, no redness/swelling at IV site  D/C instructions reviewed, Pt verbalized understanding.  Michelle Fernandez RN    Date Medication Total mg Used mg Wasted mg   04/24/2025 Av[facility] 500.00 500.00 0.00   02/24/2025 [facility] 500.00 500.00 0.00   01/06/2025 Avsola 500.00 500.00 0.00   11/04/2024 Avsola 500.00 500.00 0.00   09/16/2024 Avsola 500.00 500.00 0.00   07/19/2024 Avsola 500.00 500.00 0.00       Visit oversight provided by:  Álvaro Dillard DO 04/24/2025 02:45  PM

## 2025-06-12 ENCOUNTER — TRANSFERRED RECORDS (OUTPATIENT)
Dept: ADMINISTRATIVE | Facility: CLINIC | Age: 50
End: 2025-06-12
Payer: COMMERCIAL

## 2025-06-13 NOTE — PROGRESS NOTES
_________________________________________________________________________________________________    Patient name: Edwin Becker  YOB: 1975  Encounter date: 06/12/2025 02:30 PM  Current date: 06/12/2025 03:37 PM  Procedure: Infusion      Infusion/Additional Medication Orders    Assessment: Ulcerative chronic pancolitis without complications K51.00     Medication grids  Order Date Medication Dose Route Rate Rate 2 Rate 3 Rate 4 Int. of Treat.   10/24/2024 Avsola 5mg/kg  mL/hr    7 Weeks   Inf. Date Medication % Conc. Inf. # Therapy Type Bag # Vials Total mgs Lot # Exp. Date   06/12/2025 Avsola  70 maintenance  5 500.00 [id number] 10/31/2028   Inf. Date Medication IV Site IV Gauge Start Stop Total Time Wt. (lbs) Wt. (kgs)   06/12/2025 Avsola left hand 22 2:21  PM 0 hour(s) 0 minute(s) 221.00 100.227     Vitals Flowsheet  Time Temp Pulse Resp Sys BP Amna BP Reaction Conc Rate   2:16 PM 96.4 75 16 129 73      2:50 PM 96.7 69 16 106 58      3:20 PM 96.6 77 16 114 62        Post Infusion  The patient did tolerate the infusion.     Nursing Notes  Order date:10/24/24  Last infusion date:4/24/25  Oral premeds per order: N/A  Specialty Pharmacy: N  Change in health status per assessment? N (if Y, describe)  IV maintenance 0.9% NS 500ml TKO  Start time:1410  IV start by: Guera Hull RN  IV and Fluid D/C time:1536  NS volume infused: <50mL  Site condition: CDI and patent throughout infusion, no redness/swelling at IV site  D/C instructions reviewed, Pt verbalized understanding.  Guera Hull RN    Date Medication Total mg Used mg Wasted mg   06/12/2025 Avsola 500.00 500.00 0.00   04/24/2025 [facility] 500.00 500.00 0.00   02/24/2025 [facility] 500.00 500.00 0.00   01/06/2025 [facility] 500.00 500.00 0.00   11/04/2024 [facility] 500.00 500.00 0.00   09/16/2024 [facility] 500.00 500.00 0.00       Visit oversight provided by:  Rolando Valera MD 06/12/2025 02:30 PM

## 2025-06-30 DIAGNOSIS — N52.9 ERECTILE DYSFUNCTION, UNSPECIFIED ERECTILE DYSFUNCTION TYPE: ICD-10-CM

## 2025-07-01 RX ORDER — SILDENAFIL 50 MG/1
50 TABLET, FILM COATED ORAL DAILY
Qty: 90 TABLET | Refills: 0 | Status: SHIPPED | OUTPATIENT
Start: 2025-07-01

## 2025-07-08 ENCOUNTER — MYC MEDICAL ADVICE (OUTPATIENT)
Dept: FAMILY MEDICINE | Facility: CLINIC | Age: 50
End: 2025-07-08
Payer: COMMERCIAL

## 2025-07-08 DIAGNOSIS — Z79.2 PROPHYLACTIC ANTIBIOTIC: Primary | ICD-10-CM

## 2025-07-08 RX ORDER — AMOXICILLIN 500 MG/1
2000 CAPSULE ORAL ONCE
Qty: 4 CAPSULE | Refills: 2 | Status: SHIPPED | OUTPATIENT
Start: 2025-07-08 | End: 2025-07-08

## 2025-07-31 ENCOUNTER — TRANSFERRED RECORDS (OUTPATIENT)
Dept: MULTI SPECIALTY CLINIC | Facility: CLINIC | Age: 50
End: 2025-07-31
Payer: COMMERCIAL

## 2025-07-31 ENCOUNTER — TRANSFERRED RECORDS (OUTPATIENT)
Dept: ADMINISTRATIVE | Facility: CLINIC | Age: 50
End: 2025-07-31
Payer: COMMERCIAL

## 2025-07-31 LAB
ALT SERPL-CCNC: 28 IU/L (ref 0–44)
AST SERPL-CCNC: 29 IU/L (ref 0–40)

## 2025-08-01 NOTE — PROGRESS NOTES
_________________________________________________________________________________________________    Patient name: Edwin Becker  YOB: 1975  Encounter date: 07/31/2025 02:30 PM  Current date: 07/31/2025 03:52 PM  Procedure: Infusion      Infusion/Additional Medication Orders    Assessment: Ulcerative chronic pancolitis without complications K51.00     Medication grids  Order Date Medication Dose Route Rate Rate 2 Rate 3 Rate 4 Int. of Treat.   10/24/2024 [facility] 5mg/kg  mL/hr    7 Weeks   Inf. Date Medication % Conc. Inf. # Therapy Type Bag # Vials Total mgs Lot # Exp. Date   07/31/2025 Avsola  71 maintenance  5 500.00 [id number] 10/31/2028   Inf. Date Medication IV Site IV Gauge Start Stop Total Time Wt. (lbs) Wt. (kgs)   07/31/2025 Avsola left hand 22 2:45 PM 3:45 PM 0 hour(s) 0 minute(s) 218.00 98.866     Vitals Flowsheet  Time Temp Pulse Resp Sys BP Amna BP Reaction Conc Rate   2:32 PM 96.8 85 20 119 72      3:15 PM 96.9 79 16 112 64      3:45 PM 97.6 82 17 118 64        Post Infusion  The patient did tolerate the infusion.     Nursing Notes  Order date: 10/24/25  Last infusion date: 6/12/25  Oral premeds per order: N/A  Specialty Pharmacy: N  Change in health status per assessment? N  IV maintenance 0.9% NS 500ml TKO  Start time: 2:35 pm   IV start by: Fiordaliza Snyder RN  IV and Fluid D/C time: 4:00pm   NS volume infused: <50mL  Site condition: CDI and patent throughout infusion, no redness/swelling at IV site  D/C instructions reviewed, Pt verbalized understanding.  Fiordaliza Snyder RN  PIV used for lab draw today    Date Medication Total mg Used mg Wasted mg   07/31/2025 [facility] 500.00 500.00 0.00   06/12/2025 [facility] 500.00 500.00 0.00   04/24/2025 [facility] 500.00 500.00 0.00   02/24/2025 [facility] 500.00 500.00 0.00   01/06/2025 [facility] 500.00 500.00 0.00   11/04/2024 [facility] 500.00 500.00 0.00       Visit oversight provided by:  Álvaro Dillard DO 07/31/2025 02:30 PM